# Patient Record
Sex: MALE | Race: WHITE | NOT HISPANIC OR LATINO | Employment: OTHER | URBAN - METROPOLITAN AREA
[De-identification: names, ages, dates, MRNs, and addresses within clinical notes are randomized per-mention and may not be internally consistent; named-entity substitution may affect disease eponyms.]

---

## 2017-01-24 ENCOUNTER — LAB CONVERSION - ENCOUNTER (OUTPATIENT)
Dept: OTHER | Facility: OTHER | Age: 77
End: 2017-01-24

## 2017-01-24 LAB
HBA1C MFR BLD HPLC: 6.9 % OF TOTAL HGB
TSH SERPL DL<=0.05 MIU/L-ACNC: 3.67 MIU/L (ref 0.4–4.5)

## 2017-01-27 ENCOUNTER — GENERIC CONVERSION - ENCOUNTER (OUTPATIENT)
Dept: OTHER | Facility: OTHER | Age: 77
End: 2017-01-27

## 2017-02-01 ENCOUNTER — ALLSCRIPTS OFFICE VISIT (OUTPATIENT)
Dept: OTHER | Facility: OTHER | Age: 77
End: 2017-02-01

## 2017-04-20 DIAGNOSIS — E11.65 TYPE 2 DIABETES MELLITUS WITH HYPERGLYCEMIA (HCC): ICD-10-CM

## 2017-04-20 DIAGNOSIS — E03.9 HYPOTHYROIDISM: ICD-10-CM

## 2017-05-11 ENCOUNTER — GENERIC CONVERSION - ENCOUNTER (OUTPATIENT)
Dept: OTHER | Facility: OTHER | Age: 77
End: 2017-05-11

## 2017-05-11 ENCOUNTER — LAB CONVERSION - ENCOUNTER (OUTPATIENT)
Dept: OTHER | Facility: OTHER | Age: 77
End: 2017-05-11

## 2017-05-11 LAB
CHOLEST SERPL-MCNC: 104 MG/DL (ref 125–200)
CHOLEST/HDLC SERPL: 3.1 (CALC)
HBA1C MFR BLD HPLC: 6 % OF TOTAL HGB
HDLC SERPL-MCNC: 34 MG/DL
LDL CHOLESTEROL (HISTORICAL): 57 MG/DL (CALC)
NON-HDL-CHOL (CHOL-HDL) (HISTORICAL): 70 MG/DL (CALC)
T4 FREE SERPL-MCNC: 1.1 NG/DL (ref 0.8–1.8)
TRIGL SERPL-MCNC: 63 MG/DL
TSH SERPL DL<=0.05 MIU/L-ACNC: 1.75 MIU/L (ref 0.4–4.5)

## 2017-05-23 ENCOUNTER — GENERIC CONVERSION - ENCOUNTER (OUTPATIENT)
Dept: OTHER | Facility: OTHER | Age: 77
End: 2017-05-23

## 2017-08-14 DIAGNOSIS — E11.65 TYPE 2 DIABETES MELLITUS WITH HYPERGLYCEMIA (HCC): ICD-10-CM

## 2017-09-20 ENCOUNTER — LAB CONVERSION - ENCOUNTER (OUTPATIENT)
Dept: OTHER | Facility: OTHER | Age: 77
End: 2017-09-20

## 2017-09-20 ENCOUNTER — GENERIC CONVERSION - ENCOUNTER (OUTPATIENT)
Dept: OTHER | Facility: OTHER | Age: 77
End: 2017-09-20

## 2017-09-20 LAB
CREATININE, RANDOM URINE (HISTORICAL): 165 MG/DL (ref 20–370)
HBA1C MFR BLD HPLC: 6.7 % OF TOTAL HGB
MAGNESIUM, UR (HISTORICAL): 1.4 MG/DL
MICROALBUMIN/CREATININE RATIO (HISTORICAL): 8 MCG/MG CREAT

## 2017-09-26 ENCOUNTER — ALLSCRIPTS OFFICE VISIT (OUTPATIENT)
Dept: OTHER | Facility: OTHER | Age: 77
End: 2017-09-26

## 2017-10-27 NOTE — PROGRESS NOTES
Assessment  1  Type 2 diabetes mellitus with ophthalmic complication, with long-term current use of   insulin (250 50,V58 67) (E11 39,Z79 4)   2  Hyperlipidemia (272 4) (E78 5)   3  Hypertension (401 9) (I10)   4  Hypothyroidism (244 9) (E03 9)    Plan  DM (diabetes mellitus), type 2, uncontrolled w/ophthalmic complication    · (1) HEMOGLOBIN A1C; Status:Canceled;    Perform:[facility]; DSZ:[id number]; Last Updated Marielle Frias; 9/25/2017 7:36:37 AM;Ordered; For:DM (diabetes mellitus), type 2, uncontrolled w/ophthalmic complication; Ordered By:Pura Jenkins; Hyperlipidemia, Hypertension, Type 2 diabetes mellitus with ophthalmic complication,  with long-term current use of insulin    · (1) COMPREHENSIVE METABOLIC PANEL; Status:Active; Requested for:44Nug4453;    Perform:[facility]; Due:10Fxb2078; Ordered;For:Hyperlipidemia, Hypertension, Type 2 diabetes mellitus with ophthalmic complication, with long-term current use of insulin; Ordered By:Pura Jenkins;  Hypothyroidism    · (1) T4, FREE; Status:Canceled;    Perform:[facility];Ordered;For:Hypothyroidism; Ordered By:Siddharth Sánchez;   · (1) TSH; Status:Canceled;    Perform:[facility]; DNP:[id number]; Last Updated Marielle Frias; 9/25/2017 7:36:37 AM;Ordered;For:Hypothyroidism; Ordered By:Pura Jenkins;   · (1) TSH; Status:Canceled;    Perform:[facility];Ordered;For:Hypothyroidism; Ordered By:Siddharth Sánchez;  Hypothyroidism, Type 2 diabetes mellitus with ophthalmic complication, with long-term  current use of insulin    · (1) T4, FREE; Status:Active; Requested for:05Ikt1653;    Perform:[facility]; Due:08Axp2393; Ordered;For:Hypothyroidism, Type 2 diabetes mellitus with ophthalmic complication, with long-term current use of insulin; Ordered By:Pura Jenkins;   · (1) TSH; Status:Active; Requested for:21Sdm9492;    Perform:[facility]; Due:16Fda3440; Ordered;For:Hypothyroidism, Type 2 diabetes mellitus with ophthalmic complication, with long-term current use of insulin; Ordered By:Mu Jenkins;  Type 2 diabetes mellitus with hyperglycemia    · (1) COMPREHENSIVE METABOLIC PANEL; Status:Canceled;    Perform:[facility];Ordered; For:Type 2 diabetes mellitus with hyperglycemia; Ordered By:Génesis Sánchez;   · (1) HEMOGLOBIN A1C; Status:Canceled;    Perform:[facility];Ordered; For:Type 2 diabetes mellitus with hyperglycemia; Ordered By:Génesis Sánchez;   · (1) LIPID PANEL, FASTING; Status:Canceled;    Perform:[facility];Ordered; For:Type 2 diabetes mellitus with hyperglycemia; Ordered By:Génesis Sánchez;  Type 2 diabetes mellitus with ophthalmic complication, with long-term current use of  insulin    · (1) HEMOGLOBIN A1C; Status:Active; Requested for:00Pge2252;    Perform:[facility]; Due:87Bgz2696; Ordered; For:Type 2 diabetes mellitus with ophthalmic complication, with long-term current use of insulin; Ordered By:Mu Jenkins; Uncontrolled type 2 diabetes mellitus, with long-term current use of insulin    · From  HumaLOG KwikPen 100 UNIT/ML Subcutaneous Solution Pen-injector  16 before breakfast, 20 before lunch, 30 before supper, 20 before  bedtime To HumaLOG KwikPen 100 UNIT/ML Subcutaneous Solution Pen-injector 16  before breakfast, 20 before lunch, 34 before supper, 10 before bedtime   Rx By: Fide Malone; Dispense: 90 Days ; #:7 X 3 ML Pen (5 Pens); Refill: 3;For: Uncontrolled type 2 diabetes mellitus, with long-term current use of insulin; ANY = N; Record   · Follow-up visit in 3 months Evaluation and Treatment  Follow-up  Status: Hold For -  Scheduling  Requested for: 81SZN6261   Ordered; For: Uncontrolled type 2 diabetes mellitus, with long-term current use of insulin; Ordered By: Fide Malone Performed:  Due: 97MBK8866    Discussion/Summary  1   Type 2 diabetes with opthalmic  complications on long-term insulin therapy -sugars fairly stable except hyperglycemia at bedtime  Increase Humalog to 34 units before dinner, continue to monitor blood sugars 4 times a day and send log in 2 weeks  being on intensive insulin therapy he is at risk for severe hypoglycemia and potential death  2  Hyperlipidemia - LDL at goal, continue statins  3  Hypertension-continue current regimen    4  Hypothyroidism-continue levothyroxine at current dose will check thyroid function test    Counseling Documentation With Imm: The patient was counseled regarding diagnostic results,-- instructions for management,-- risk factor reductions,-- impressions,-- risks and benefits of treatment options,-- importance of compliance with treatment  Patient's Capacity to Self-Care: Patient is able to Self-Care  Medication SE Review and Pt Understands Tx: Possible side effects of new medications were reviewed with the patient/guardian today  The treatment plan was reviewed with the patient/guardian  The patient/guardian understands and agrees with the treatment plan      Chief Complaint  Chief Complaint Free Text Note Form: Follow up  History of Present Illness  Diabetes: The patient is being seen for routine follow-up of Diabetes Mellitus 2  Current treatment includes Basal Insulin-- and-- Humalog  See Medication List for current medication(s)  See Medication List for dosage(s)  Source of information reported by review of the patient's logbook and indicates that the patient checks his blood sugar four times per day  Fasting blood sugars: generally 120-150  Pre-prandial blood sugars: generally 120-150  Bedtime blood sugars: generally 150-200  Symptoms reported by the patient include fatigue,-- recent weight gain-- and-- edema, but-- no polydipsia,-- no extremity numbness,-- no polyuria,-- no extremity paresthesias,-- no blurred vision,-- no diarrhea,-- no lower extremity ulcers,-- no nausea-- and-- no recent weight loss  Disease Course and Complications:  The last dilated fundus exam showed retinopathy  Family History: diabetes mellitus type 2  Hypothyroidism: The patient is being seen for follow-up of hypothyroidism  The patient has hypothyroidism of undetermined etiology  Current treatment includes levothyroxine  Symptoms:  fatigue,-- weight gain,-- myalgias,-- arthralgias-- and-- peripheral edema, but-- no weight loss,-- no palpitations,-- no constipation-- and-- no diarrhea  Hyperlipidemia (Follow-Up): The patient states his hyperlipidemia has been under good control since the last visit  Comorbid Illnesses: diabetes mellitus-- and-- hypertension  Symptoms: denies chest pain,-- denies intermittent leg claudication,-- denies muscle pain-- and-- denies muscle weakness  Associated symptoms include no focal neurologic deficits-- and-- no memory loss  Medications: the patient is adherent with his medication regimen  -- He denies medication side effects  Medication(s): a statin  Hypertension (Follow-Up): The patient presents for follow-up of essential hypertension  Comorbid Illnesses: retinopathy  Symptoms: denies impaired vision,-- stable dyspnea,-- denies chest pain,-- denies intermittent leg claudication-- and-- worsened lower extremity edema  Associated symptoms include no headache,-- no focal neurologic deficits-- and-- no memory loss  Medications: the patient is adherent with his medication regimen  -- He denies medication side effects  Medication(s): a diuretic-- and-- an ACE inhibitor  Review of Systems  Endo Adult ROS Male Established v2 - [facility]:   Constitutional/General: no recent weight gain,-- no recent weight loss,-- no poor energy/fatigue,-- no increased energy level,-- no insomnia/sleep problems,-- no fever-- and-- no feeling weak  Heart: high blood pressure, but-- no chest pain/tightness,-- no rapid/racing heart rate-- and-- no palpitations  Genitourinary - Urinary frequent urination, but-- no excess urination-- and-- no urinating during the night  Eyes: no blurred vision,-- no double vision,-- no bulging eyes,-- no gritty/scratchy eyes-- and-- no excessive tearing  Mouth / Throat: no hoarseness-- and-- no difficulty swallowing  Neck: no lumps,-- no swollen glands,-- no neck pain,-- no neck stiffness-- and-- no enlarged thyroid  Respiratory: no wheezing,-- no asthma-- and-- no persistent cough  Musculoskeletal: no muscle aches/pain,-- no joint aches/pain-- and-- no muscle weakness  Skin & Hair: no dry skin,-- no acne,-- the hair texture was not oily,-- no hair loss-- and-- no excessive hair growth  Gastrointestinal: no constipation,-- no diarrhea,-- no waking at night to drink-- and-- no stomach ache  Neurological: no blackouts,-- no weakness-- and-- no tremors  Genital: no testicular pain,-- no testicular lumps/bumps/mass-- and-- performs monthly testicular exam    Endocrine: no feeling hot frequently,-- no feeling cold frequently,-- no shifts between feeling hot and cold,-- no cold hands or feet,-- no excessive sweating,-- no thyroid problems,-- blood sugar problems,-- no excessive thirst,-- no excessive hunger,-- no change in shoe size,-- no nausea or vomiting-- and-- no shaky hands  ROS Reviewed:   ROS reviewed  Active Problems  1  DM (diabetes mellitus), type 2, uncontrolled w/ophthalmic complication (567 24)   (E11 39,E11 65)   2  Hyperlipidemia (272 4) (E78 5)   3  Hypertension (401 9) (I10)   4  Hypothyroidism (244 9) (E03 9)   5  Long-term insulin use (V58 67) (Z79 4)   6  Obesity (278 00) (E66 9)   7  Type 2 Diabetes Mellitus - Uncomplicated, Controlled (250 00)   8  Type 2 diabetes mellitus with hyperglycemia (250 00) (E11 65)    Past Medical History  1  History of Congestive heart failure (428 0) (I50 9)   2  History of Stroke Syndrome (436)  Active Problems And Past Medical History Reviewed: The active problems and past medical history were reviewed and updated today        Surgical History  Surgical History Reviewed: The surgical history was reviewed and updated today  Family History  Mother    1  Family history of Heart disease  Father    2  Family history of Heart disease  Son    3  Family history of Diabetes Mellitus (V18 0)  Family History Reviewed: The family history was reviewed and updated today  Social History   · Denied: History of Alcohol Use (History)   · Caffeine Use   · Denied: History of Drug Use   · Former smoker (W55 87) (S58 002)  Social History Reviewed: The social history was reviewed and updated today  Current Meds   1  BD Pen Needle Mini U/F 31G X 5 MM Miscellaneous; Use 6 times per day; Therapy: 21Dec2012 to (Kalpana Joya)  Requested for: 82FZP9231; Last   Rx:10Mar2017 Ordered   2  Calcium 600 MG Oral Tablet; Therapy: (Recorded:08Oct2013) to Recorded   3  Eye Vitamins Oral Capsule; Take as directed Recorded   4  FreeStyle Lite Test In Vitro Strip; TEST FOUR TIMES DAILY; Therapy: 78ESH1158 to (34 [phone])  Requested for: 06BXC9129; Last   Rx:54Vtp8071 Ordered   5  Furosemide 40 MG Oral Tablet; Therapy: [phone]) to Recorded   6  Glucosamine Chondroitin Complx CAPS; Therapy: [phone]) to Recorded   7  HumaLOG KwikPen 100 UNIT/ML Subcutaneous Solution Pen-injector; 16 before   breakfast, 20 before lunch, 30 before supper, 20 before bedtime; Therapy: 96SAE1066 to (Evaluate:20Duk4117)  Requested for: 53QOQ4588 Recorded   8  Lipitor 10 MG Oral Tablet; Therapy: [phone]) to Recorded   9  Lisinopril 20 MG Oral Tablet; Therapy: 67Fun8965 to (Evaluate:11Jan2015) Recorded   10  Multivitamins CAPS; Therapy: [phone]) to Recorded   11  Pantoprazole Sodium 40 MG Oral Tablet Delayed Release; Therapy: [phone]) to Recorded   12  Synthroid 88 MCG Oral Tablet; TAKE 1 TABLET DAILY; Therapy: 01YNH6795 to (Last Rx:03Orq3791)  Requested for: 74Xdq1290 Ordered   13  Tamsulosin HCl - 0 4 MG Oral Capsule;     Therapy: ([phone]) to Recorded   14  Dwayne SoloStar 300 UNIT/ML Subcutaneous Solution Pen-injector; 90 units qhs;    Therapy: 10IWM4436 to (Evaluate:25Mar2018)  Requested for: 93LZA7660 Recorded  Medication List Reviewed: The medication list was reviewed and updated today  Allergies  1  No Known Drug Allergies    Vitals  Vital Signs    Recorded: 61DLT5559 10:58AM   Heart Rate 72   Systolic 943   Diastolic 50   Height 5 ft 10 in   Weight 267 lb 4 96 oz   BMI Calculated 38 35   BSA Calculated 2 36     Physical Exam    Constitutional   General appearance: No acute distress, well appearing and well nourished  Eyes   Conjunctiva and lids: No swelling, erythema, or discharge  Pupils: Equal, round and reactive to light  The sclera are anicteric  Extraocular movements are intact  Ears, Nose, Mouth, and Throat   External inspection of ears, nose and lips: Normal     Exam of Head: The head is atraumatic and normocephalic  Neck: The neck is supple  The thyroid is normal in size with no palpable nodules  Pulmonary   Auscultation of lungs: Clear to auscultation bilaterally with normal chest expansion  Cardiovascular   Auscultation of heart: Normal rate and rhythm with no murmurs, gallops or rubs  Examination of extremities for edema and/or varicosities: Abnormal  -- + edema b/l LE , + chronic vascular changes  Examination of pulses: Dorsalis pedal pulses are +2 and equal bilaterally  Examination of Carotids: No bruits  Abdomen   Abdomen: Abdomen is soft, non-tender with normal bowel sounds  Lymphatic   Palpation of lymph nodes: No supraclavicular or suboccipital lymphadenopathy  Musculoskeletal   Gait and station: Normal     Inspection/palpation of joints, bones, and muscles: Muscle bulk and tone is normal     Skin   Skin and subcutaneous tissue: Normal skin temperature and color  Neurologic   Motor Strength: Strength is 5/5 bilaterally      Psychiatric   Orientation to person, place and time: Normal     Mood and affect: Affect and attention span are normal        Results/Data  (Q) MICROALBUMIN, RANDOM URINE (W/CREATININE) 44Axu0762 08:59AM Mariajose Kenyon     Test Name Result Flag Reference   CREATININE, RANDOM URINE 165 mg/dL     MICROALBUMIN 1 4 mg/dL     Reference Range  Not established   MICROALBUMIN/CREATININE$RATIO, RANDOM URINE 8 mcg/mg creat  <30   The ADA defines abnormalities in albumin  excretion as follows:     Category         Result (mcg/mg creatinine)     Normal                    <30  Microalbuminuria            Clinical albuminuria   > OR = 300     The ADA recommends that at least two of three  specimens collected within a 3-6 month period be  abnormal before considering a patient to be  within a diagnostic category  (Q) HEMOGLOBIN A1c 82Apv5886 08:59AM Primo Mariano   REPORT COMMENT:  FASTING:YES     Test Name Result Flag Reference   HEMOGLOBIN A1c 6 7 % of total Hgb H <5 7   For someone without known diabetes, a hemoglobin A1c  value of 6 5% or greater indicates that they may have   diabetes and this should be confirmed with a follow-up   test      For someone with known diabetes, a value <7% indicates   that their diabetes is well controlled and a value   greater than or equal to 7% indicates suboptimal   control  A1c targets should be individualized based on   duration of diabetes, age, comorbid conditions, and   other considerations  Currently, no consensus exists regarding use of  hemoglobin A1c for diagnosis of diabetes for children  (1) LIPID PANEL, FASTING 68HIC1923 08:10AM Ino Sánchez     Test Name Result Flag Reference   CHOLESTEROL, TOTAL 104 mg/dL L 125-200   HDL CHOLESTEROL 34 mg/dL L > OR = 40   TRIGLICERIDES 63 mg/dL  <472   LDL-CHOLESTEROL 57 mg/dL (calc)  <130   Desirable range <100 mg/dL for patients with CHD or  diabetes and <70 mg/dL for diabetic patients with  known heart disease     CHOL/HDLC RATIO 3 1 (calc)  < OR = 5 0   NON HDL CHOLESTEROL 70 mg/dL (calc)     Target for non-HDL cholesterol is 30 mg/dL higher than   LDL cholesterol target       Signatures   Electronically signed by : JOSÉ LUIS Larsen ; Sep 26 2017 11:37AM EST                       (Author)

## 2017-12-26 DIAGNOSIS — I10 ESSENTIAL (PRIMARY) HYPERTENSION: ICD-10-CM

## 2017-12-26 DIAGNOSIS — E03.9 HYPOTHYROIDISM: ICD-10-CM

## 2017-12-26 DIAGNOSIS — E78.5 HYPERLIPIDEMIA: ICD-10-CM

## 2017-12-26 DIAGNOSIS — E11.39 TYPE 2 DIABETES MELLITUS WITH OTHER DIABETIC OPHTHALMIC COMPLICATION (HCC): ICD-10-CM

## 2018-01-10 NOTE — MISCELLANEOUS
Provider Comments  Provider Comments:   PATIENT NO SHOWED FOR 5- APPT  Signatures   Electronically signed by :  Yrn Jordan; May 23 2017 11:43AM EST                       (Author)

## 2018-01-11 NOTE — PROGRESS NOTES
Plan    1  DSMT/MNT Time Record; Status:Complete;   Done: 91TTS9210 01:00PM    Discussion/Summary    PATIENT EDUCATION RECORD   Indication for Services: type 2 Diabetes Mellitus and obesity  He is ready to learn  He has no barriers to learning  Healthy Eating:   Discussed importance of meal timing/consistency: Method: Instruction and Handout  Response: Verbalizes Understanding and Needs Review   Discussed portion sizes: Method: Instruction and Handout  Response: Verbalizes Understanding and Needs Review   Discussed food label reading: Method: Instruction  Response: Verbalizes Understanding  Provided food diary and instructions on use: Method: Instruction and HandoutResponse: Verbalizes Understanding and Needs Review   Provided meal planning: Method: Instruction and Handout  Response: Verbalizes Understanding and Needs Review His current weight is 269  His keal needs are 1800/day  His CHO's per meal are 45-60  His protein needs are 85 g/day  He/She was provided a meal plan for: fixed carbohydrates and weight loss  Discussed basic carbohydrate counting: Method: Instruction and Handout  Response: Verbalizes Understanding and Needs Review   Being Active:   Stated the benefits of exercise: Method: Instruction  Response: Verbalizes Understanding and Needs Review He was given the following educational materials: portion book, Personal Meal Plan 1800 calories and CCS 2 week 1800 heather menu   Chief Complaint  Gabino's diet history reveals moderate carbohydrate intake at breakfast and increasing amounts as the day proceeds  Inadequate non-starchy vegetables, fruit, fiber, and low-fat dairy intake is evident  Discussed importance of consistent intake to ensure improved glucose excursions and BG readings at meals  Recommended additional vegetable intake to replace large carbohydrate portions  Explained benefit of exercise to BG as well as BP and cholesterol levels   He seemed willing to start walking in the mornings before his wife gets going since she needs his assistance with ADL's  Recommended he complete 3 day food record and return for f/u for further education  History of Present Illness  Gabino was seen for MNT for uncontrolled type 2 diabetes  This is his initial assessment    Present at session: patient  and spouse    He has no special learning needs  His caloric needs are 1800  He has had no recent weight change  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  Exercise routine:  None formal  Active daily with household duties and caregiving as wife is s/p CVA and is wheelchair bound  He eats breakfast at  AM ~2 c  Cheerios w/ splenda and 2% milk OR toasted bagel thins w/ lite cream cheese, hot tea    He eats lunch at  12:30-1 PM None yet today  Yesterday had sliced hard boiled egg sandwich w/ alanis on 2 slices white bread, 1-2 store bakery cookies, iced tea or diet soda   He snacks at mid PM 5-6 unsalted saltine crackers or sugar free chocolate coated vanilla ice cream pop, diet soda or tea   He eats dinner at  6:30 PM 1 cup or more ziti noodles w/ Ragu pasta sauce and meatball, 2 slices [place] bread OR 2c  pasta casserole   He snacks at 11:30 at bedtime 5-6 crackers but usually nothing     NUTRITION DIAGNOSES   Inconsistent Intake Carbs   Inconsistent carbohydrate intake related to: Physiological causes requiring careful timing and consistency in the amount of carbohydrate (i e  diabetes mellitus, hypoglycemia)  As evidenced by: Estimated carbohydrate intake that is different from recommended types or ingested on an irregular basis and Use of insulin or insulin secretagogues   Food And Nutrition Related Knowledge Defici   Food and nutrition related knowledge deficit related to  unwilling or disinterested in learning/applying information    As evidenced by  verbalizes inaccurate or incomplete information and demonstrates inability to apply food and nutrition related information (i e  select foods based on nutrition therapy)  He received extensive carbohydrate counting education in January 2015 but recall reveals little application of same  Medical Nutrition Therapy Intervention: Carbohydrate counting, Meal planning, Individualized meal plan, Strategies to increase the intake of plant based foods, Strategies to monitor portion control, Label reading, Meal timing and Exercise Guidelines  His comprehension was good   His motivation was fair   His compliance was fair   Goals:  1  Cut supper starch portions in half  2  Add morning walk before your wife arises  3   Eat more non-starchy vegetables      Vitals  Signs [Data Includes: Current Encounter]   Recorded: 72ZWQ8425 10:24AM   Height: 5 ft 10 in  Weight: 269 lb   BMI Calculated: 38 6  BSA Calculated: 2 37    Future Appointments    Date/Time Provider Specialty Site   06/14/2016 11:00 AM [place] [facility]     Signatures   Electronically signed by : Justin Soria RD; Mar 23 2016 10:52AM EST                       (Author)    Electronically signed by : JOSÉ LUIS Hays ; Mar 23 2016  2:29PM EST

## 2018-01-12 VITALS
DIASTOLIC BLOOD PRESSURE: 50 MMHG | SYSTOLIC BLOOD PRESSURE: 130 MMHG | BODY MASS INDEX: 38.27 KG/M2 | HEART RATE: 72 BPM | WEIGHT: 267.31 LBS | HEIGHT: 70 IN

## 2018-01-13 NOTE — RESULT NOTES
Verified Results  (1) LIPID PANEL, FASTING [id number] 08:10AM Nicole Sánchez Melony     Test Name Result Flag Reference   CHOLESTEROL, TOTAL 104 mg/dL L 125-200   HDL CHOLESTEROL 34 mg/dL L > OR = 40   TRIGLICERIDES 63 mg/dL  <449   LDL-CHOLESTEROL 57 mg/dL (calc)  <130   Desirable range <100 mg/dL for patients with CHD or  diabetes and <70 mg/dL for diabetic patients with  known heart disease  CHOL/HDLC RATIO 3 1 (calc)  < OR = 5 0   NON HDL CHOLESTEROL 70 mg/dL (calc)     Target for non-HDL cholesterol is 30 mg/dL higher than   LDL cholesterol target  (1) T4, FREE [id number] 08:10AM [facility] Melony     Test Name Result Flag Reference   T4, FREE 1 1 ng/dL  0 8-1 8     (Q) TSH, 3RD GENERATION 33KES8430 08:10AM Nicole Sánchez Melony     Test Name Result Flag Reference   TSH 1 75 mIU/L  0 40-4 50     (Q) HEMOGLOBIN A1c [id number] 08:10AM Isai Sánchez   REPORT COMMENT:  FASTING:YES     Test Name Result Flag Reference   HEMOGLOBIN A1c 6 0 % of total Hgb H <5 7   For someone without known diabetes, a hemoglobin   A1c value between 5 7% and 6 4% is consistent with  prediabetes and should be confirmed with a   follow-up test      For someone with known diabetes, a value <7%  indicates that their diabetes is well controlled  A1c  targets should be individualized based on duration of  diabetes, age, comorbid conditions, and other  considerations  This assay result is consistent with an increased risk  of diabetes  Currently, no consensus exists regarding use of  hemoglobin A1c for diagnosis of diabetes for children

## 2018-01-14 VITALS
BODY MASS INDEX: 37.26 KG/M2 | SYSTOLIC BLOOD PRESSURE: 112 MMHG | HEIGHT: 70 IN | WEIGHT: 260.31 LBS | HEART RATE: 72 BPM | DIASTOLIC BLOOD PRESSURE: 62 MMHG

## 2018-01-15 NOTE — RESULT NOTES
Message   a1c 6 9 at goal, kidney function tests high - could be due to dehydration - keep well hydrated and repeat BMP in 1 week     Verified Results  (1) LIPID PANEL, FASTING 23Jan2017 07:29AM Ajmes Acre     Test Name Result Flag Reference   CHOLESTEROL, TOTAL 92 mg/dL L 125-200   HDL CHOLESTEROL 31 mg/dL L > OR = 40   TRIGLICERIDES 82 mg/dL  <503   LDL-CHOLESTEROL 45 mg/dL (calc)  <130   Desirable range <100 mg/dL for patients with CHD or  diabetes and <70 mg/dL for diabetic patients with  known heart disease  CHOL/HDLC RATIO 3 0 (calc)  < OR = 5 0   NON HDL CHOLESTEROL 61 mg/dL (calc)     Target for non-HDL cholesterol is 30 mg/dL higher than   LDL cholesterol target  (1) COMPREHENSIVE METABOLIC PANEL [id number] 77:98VV James Acre     Test Name Result Flag Reference   GLUCOSE 100 mg/dL H 65-99   Fasting reference interval   UREA NITROGEN (BUN) 33 mg/dL H 7-25   CREATININE 1 43 mg/dL H 0 70-1 18   For patients >52years of age, the reference limit  for Creatinine is approximately 13% higher for people  identified as -American  eGFR NON-AFR   AMERICAN 48 mL/min/1 73m2 L > OR = 60   eGFR AFRICAN AMERICAN 55 mL/min/1 73m2 L > OR = 60   BUN/CREATININE RATIO 23 (calc) H 6-22   SODIUM 143 mmol/L  135-146   POTASSIUM 5 0 mmol/L  3 5-5 3   CHLORIDE 99 mmol/L     CARBON DIOXIDE 33 mmol/L H 20-31   CALCIUM 9 5 mg/dL  8 6-10 3   PROTEIN, TOTAL 6 6 g/dL  6 1-8 1   ALBUMIN 3 9 g/dL  3 6-5 1   GLOBULIN 2 7 g/dL (calc)  1 9-3 7   ALBUMIN/GLOBULIN RATIO 1 4 (calc)  1 0-2 5   BILIRUBIN, TOTAL 0 8 mg/dL  0 2-1 2   ALKALINE PHOSPHATASE 75 U/L     AST 26 U/L  10-35   ALT 22 U/L  9-46     (1) T4, FREE 23Jan2017 07:29AM James Acre     Test Name Result Flag Reference   T4, FREE 1 1 ng/dL  0 8-1 8     (Q) TSH, 3RD GENERATION [id number] 07:29AM James Acre     Test Name Result Flag Reference   TSH 3 67 mIU/L  0 40-4 50     (Q) HEMOGLOBIN A1c [id number] 07:29AM James Patel   REPORT COMMENT:  [id number]  FASTING:YES     Test Name Result Flag Reference   HEMOGLOBIN A1c 6 9 % of total Hgb H <5 7   According to ADA guidelines, hemoglobin A1c <7 0%  represents optimal control in non-pregnant diabetic  patients  Different metrics may apply to specific  patient populations  Standards of Medical Care in    Diabetes Care  2013;36:s11-s66     For the purpose of screening for the presence of  diabetes  <5 7%       Consistent with the absence of diabetes  5 7-6 4%    Consistent with increased risk for diabetes              (prediabetes)  >or=6 5%    Consistent with diabetes     This assay result is consistent with diabetes  mellitus  Currently, no consensus exists for use of hemoglobin  A1c for diagnosis of diabetes for children

## 2018-02-23 ENCOUNTER — TELEPHONE (OUTPATIENT)
Dept: ENDOCRINOLOGY | Facility: CLINIC | Age: 78
End: 2018-02-23

## 2018-02-24 LAB
T4 FREE SERPL-MCNC: 1.1 NG/DL (ref 0.8–1.8)
TSH SERPL-ACNC: 1.21 MIU/L (ref 0.4–4.5)

## 2018-02-26 LAB
ALBUMIN SERPL-MCNC: 4 G/DL (ref 3.6–5.1)
ALBUMIN/GLOB SERPL: 1.5 (CALC) (ref 1–2.5)
ALP SERPL-CCNC: 78 U/L (ref 40–115)
ALT SERPL-CCNC: 21 U/L (ref 9–46)
AST SERPL-CCNC: 21 U/L (ref 10–35)
BASOPHILS # BLD AUTO: 39 CELLS/UL (ref 0–200)
BASOPHILS NFR BLD AUTO: 0.6 %
BILIRUB SERPL-MCNC: 0.6 MG/DL (ref 0.2–1.2)
BUN SERPL-MCNC: 75 MG/DL (ref 7–25)
BUN/CREAT SERPL: 48 (CALC) (ref 6–22)
CALCIUM SERPL-MCNC: 10 MG/DL (ref 8.6–10.3)
CHLORIDE SERPL-SCNC: 105 MMOL/L (ref 98–110)
CO2 SERPL-SCNC: 35 MMOL/L (ref 20–31)
CREAT SERPL-MCNC: 1.57 MG/DL (ref 0.7–1.18)
EOSINOPHIL # BLD AUTO: 143 CELLS/UL (ref 15–500)
EOSINOPHIL NFR BLD AUTO: 2.2 %
ERYTHROCYTE [DISTWIDTH] IN BLOOD BY AUTOMATED COUNT: 13.9 % (ref 11–15)
EST. AVERAGE GLUCOSE BLD GHB EST-MCNC: 154 (CALC)
EST. AVERAGE GLUCOSE BLD GHB EST-SCNC: 8.5 (CALC)
FERRITIN SERPL-MCNC: 47 NG/ML (ref 20–380)
GLOBULIN SER CALC-MCNC: 2.6 G/DL (CALC) (ref 1.9–3.7)
GLUCOSE SERPL-MCNC: 130 MG/DL (ref 65–99)
HBA1C MFR BLD: 7 % OF TOTAL HGB
HCT VFR BLD AUTO: 40 % (ref 38.5–50)
HGB BLD-MCNC: 13.4 G/DL (ref 13.2–17.1)
IRON SATN MFR SERPL: 35 % (CALC) (ref 15–60)
IRON SERPL-MCNC: 112 MCG/DL (ref 50–180)
LYMPHOCYTES # BLD AUTO: 995 CELLS/UL (ref 850–3900)
LYMPHOCYTES NFR BLD AUTO: 15.3 %
MCH RBC QN AUTO: 30.1 PG (ref 27–33)
MCHC RBC AUTO-ENTMCNC: 33.5 G/DL (ref 32–36)
MCV RBC AUTO: 89.9 FL (ref 80–100)
MONOCYTES # BLD AUTO: 527 CELLS/UL (ref 200–950)
MONOCYTES NFR BLD AUTO: 8.1 %
NEUTROPHILS # BLD AUTO: 4797 CELLS/UL (ref 1500–7800)
NEUTROPHILS NFR BLD AUTO: 73.8 %
PLATELET # BLD AUTO: 164 THOUSAND/UL (ref 140–400)
PMV BLD REES-ECKER: 9.5 FL (ref 7.5–12.5)
POTASSIUM SERPL-SCNC: 5.1 MMOL/L (ref 3.5–5.3)
PROT SERPL-MCNC: 6.6 G/DL (ref 6.1–8.1)
RBC # BLD AUTO: 4.45 MILLION/UL (ref 4.2–5.8)
SL AMB EGFR AFRICAN AMERICAN: 49 ML/MIN/1.73M2
SL AMB EGFR NON AFRICAN AMERICAN: 42 ML/MIN/1.73M2
SODIUM SERPL-SCNC: 143 MMOL/L (ref 135–146)
TIBC SERPL-MCNC: 321 MCG/DL (CALC) (ref 250–425)
WBC # BLD AUTO: 6.5 THOUSAND/UL (ref 3.8–10.8)

## 2018-02-27 RX ORDER — GLUCOSAMINE SULF/CHONDROITIN A 500-250 MG
1 CAPSULE ORAL DAILY
COMMUNITY
End: 2018-08-16

## 2018-02-27 RX ORDER — TAMSULOSIN HYDROCHLORIDE 0.4 MG/1
1 CAPSULE ORAL DAILY
COMMUNITY
End: 2018-08-02 | Stop reason: SDUPTHER

## 2018-02-27 RX ORDER — ATORVASTATIN CALCIUM 10 MG/1
1 TABLET, FILM COATED ORAL DAILY
COMMUNITY
End: 2018-12-05 | Stop reason: DRUGHIGH

## 2018-02-27 RX ORDER — PHENOL 1.4 %
1 AEROSOL, SPRAY (ML) MUCOUS MEMBRANE DAILY
COMMUNITY
End: 2018-10-30 | Stop reason: ALTCHOICE

## 2018-02-27 RX ORDER — PANTOPRAZOLE SODIUM 40 MG/1
1 TABLET, DELAYED RELEASE ORAL DAILY
COMMUNITY
End: 2018-09-10 | Stop reason: SDUPTHER

## 2018-02-27 RX ORDER — FUROSEMIDE 40 MG/1
80 TABLET ORAL 2 TIMES DAILY
COMMUNITY
End: 2018-10-30 | Stop reason: SDUPTHER

## 2018-02-27 RX ORDER — LISINOPRIL 20 MG/1
1 TABLET ORAL DAILY
COMMUNITY
Start: 2014-02-21 | End: 2018-12-05 | Stop reason: SDUPTHER

## 2018-02-27 RX ORDER — LEVOTHYROXINE SODIUM 88 UG/1
1 TABLET ORAL DAILY
COMMUNITY
Start: 2011-12-13 | End: 2018-12-05 | Stop reason: SDUPTHER

## 2018-03-01 ENCOUNTER — OFFICE VISIT (OUTPATIENT)
Dept: ENDOCRINOLOGY | Facility: CLINIC | Age: 78
End: 2018-03-01
Payer: MEDICARE

## 2018-03-01 VITALS
WEIGHT: 238 LBS | BODY MASS INDEX: 35.25 KG/M2 | HEART RATE: 58 BPM | DIASTOLIC BLOOD PRESSURE: 60 MMHG | SYSTOLIC BLOOD PRESSURE: 106 MMHG | HEIGHT: 69 IN

## 2018-03-01 DIAGNOSIS — E78.5 HYPERLIPIDEMIA, UNSPECIFIED HYPERLIPIDEMIA TYPE: Primary | ICD-10-CM

## 2018-03-01 DIAGNOSIS — E03.9 HYPOTHYROIDISM, UNSPECIFIED TYPE: ICD-10-CM

## 2018-03-01 DIAGNOSIS — Z79.4 TYPE 2 DIABETES MELLITUS WITH OTHER OPHTHALMIC COMPLICATION, WITH LONG-TERM CURRENT USE OF INSULIN (HCC): ICD-10-CM

## 2018-03-01 DIAGNOSIS — E11.39 TYPE 2 DIABETES MELLITUS WITH OTHER OPHTHALMIC COMPLICATION, WITH LONG-TERM CURRENT USE OF INSULIN (HCC): ICD-10-CM

## 2018-03-01 DIAGNOSIS — I10 HYPERTENSION, UNSPECIFIED TYPE: ICD-10-CM

## 2018-03-01 PROBLEM — J96.10 CHRONIC RESPIRATORY FAILURE (HCC): Status: ACTIVE | Noted: 2017-10-24

## 2018-03-01 PROBLEM — K92.1 HEMATOCHEZIA: Status: ACTIVE | Noted: 2017-08-20

## 2018-03-01 PROBLEM — G47.33 OBSTRUCTIVE SLEEP APNEA SYNDROME: Status: ACTIVE | Noted: 2017-05-02

## 2018-03-01 PROBLEM — N17.9 AKI (ACUTE KIDNEY INJURY) (HCC): Status: ACTIVE | Noted: 2017-01-11

## 2018-03-01 PROBLEM — I35.0 AORTIC STENOSIS, MODERATE: Status: ACTIVE | Noted: 2017-05-03

## 2018-03-01 PROBLEM — K21.9 GERD (GASTROESOPHAGEAL REFLUX DISEASE): Status: ACTIVE | Noted: 2017-01-11

## 2018-03-01 PROBLEM — I87.2 VENOUS INSUFFICIENCY: Status: ACTIVE | Noted: 2017-11-20

## 2018-03-01 PROBLEM — I50.9 CHF (CONGESTIVE HEART FAILURE) (HCC): Status: ACTIVE | Noted: 2017-01-11

## 2018-03-01 PROBLEM — N40.0 BPH (BENIGN PROSTATIC HYPERPLASIA): Status: ACTIVE | Noted: 2017-01-11

## 2018-03-01 PROBLEM — D50.9 HYPOCHROMIC MICROCYTIC ANEMIA: Status: ACTIVE | Noted: 2017-08-21

## 2018-03-01 PROBLEM — D69.6 THROMBOCYTOPENIA (HCC): Status: ACTIVE | Noted: 2017-01-20

## 2018-03-01 PROCEDURE — 99215 OFFICE O/P EST HI 40 MIN: CPT | Performed by: PHYSICIAN ASSISTANT

## 2018-03-01 NOTE — ASSESSMENT & PLAN NOTE
At goal based on recent A1C of 7 0  He is having consistent hyperglycemia at bedtime  Increase Pre-Dinner Novolog from 34 to 38 units  Due to being on intensive insulin therapy, he at high risk of severe hypoglycemia  Severe hypoglycemia can result in falls, injury, coma, seizure, or death

## 2018-03-01 NOTE — PATIENT INSTRUCTIONS
Increase Humalog to 38 before dinner  Send log in two weeks or call ASAP if problems    Complete lab testing before next visit

## 2018-03-01 NOTE — LETTER
March 1, 2018     MD Gwen [address]  [address] [facility]    Patient: Travis Deshpande   YOB: 1940   Date of Visit: 3/1/2018       Dear Dr Christopher Hdz:    Thank you for referring Dee Sands to me for evaluation  Below are my notes for this consultation  If you have questions, please do not hesitate to call me  I look forward to following your patient along with you  Sincerely,        Nasreen Bhat PA-C        CC: No Recipients  Nasreen Bhat PA-C  3/1/2018  1:20 PM  Sign at close encounter      Established Patient Progress Note      Chief Complaint   Patient presents with    Diabetes Type 2    Hypertension    Hyperlipidemia    Hypothyroidism          History of Present Illness:   Travis Deshpande is a 69 y o  male with a history of type 2 diabetes with long term use of insulin   Michael Velazquez Reports complications of retinopathy and CKD  Since last visit he was in hospital with CHF and anemia  Did not need blood transfusion    Denies recent severe hypoglycemic or severe hyperglycemic episodes  Denies any issues with his current regimen  home glucose monitoring: are performed regularly 4x per day  Blood sugars have been better overall since taking Toujeo  Home blood glucose readings:   Before breakfast: low 100s  Before lunch: low/mid 100s  Before dinner: low/mid 100s   Bedtime: 180 to over 200  Current regimen: Toujeo 60 units daily (lowered in hospital) and Humalog 16-20-34 before meals plus 10 at bedtime if BG over 200  Last Eye Exam: UTD, had recent appt   Last Foot Exam: UTD with podiatry, had appt in January  Has hypertension: Taking lisinopril  Has hyperlipidemia: Taking atorvasatin    Thyroid disorders: hypothyroid- taking levothyroxine       Patient Active Problem List   Diagnosis    TIFFANY (acute kidney injury) ([facility] [address] )    Aortic stenosis, moderate    BPH (benign prostatic hyperplasia)    CH[facility]congestive heart failure) ([facility])    Chronic respiratory failure (HCC)    GERD (gastroesophageal reflux disease)    Hematochezia    Hyperlipidemia    Hypertension    Hypochromic microcytic anemia    Hypothyroidism    Obesity    Obstructive sleep apnea syndrome    Thrombocytopenia (HCC)    Type 2 diabetes mellitus with ophthalmic complication, with long-term current use of insulin (HCC)    Venous insufficiency      History reviewed  No pertinent past medical history  History reviewed  No pertinent surgical history     Family History   Problem Relation Age of Onset    Diabetes unspecified Maternal Grandfather     Diabetes unspecified Paternal Grandfather      Social History   Substance Use Topics    Smoking status: Never Smoker    Smokeless tobacco: Never Used    Alcohol use No     No Known Allergies      Current Outpatient Prescriptions:     atorvastatin (LIPITOR) 10 mg tablet, Take 1 tablet by mouth daily, Disp: , Rfl:     calcium carbonate (CALCIUM 600) 600 MG tablet, Take 1 tablet by mouth daily, Disp: , Rfl:     furosemide (LASIX) 40 mg tablet, Take 80 mg by mouth 2 (two) times a day  , Disp: , Rfl:     Glucosamine-Chondroitin 500-250 MG CAPS, Take 1 capsule by mouth daily, Disp: , Rfl:     glucose blood (FREESTYLE LITE) test strip, 1 application by In Vitro route 4 (four) times a day, Disp: , Rfl:     Insulin Glargine (TOUJEO SOLOSTAR) injection pen 300 units/mL, Inject 60 Units under the skin daily, Disp: 3 pen, Rfl: 0    insulin lispro (HUMALOG KWIKPEN) 100 Units/mL SOPN, Inject 60 Units under the skin see administration instructions 16 units with breakfast 20 with lunch 38 with dinner 10 at bedtime if needed if BG over 200, Disp: 5 pen, Rfl: 0    levothyroxine (SYNTHROID) 88 mcg tablet, Take 1 tablet by mouth daily, Disp: , Rfl:     lisinopril (ZESTRIL) 20 mg tablet, Take 1 tablet by mouth daily, Disp: , Rfl:     Multiple Vitamins-Minerals (EYE VITAMINS) CAPS, Take 1 capsule by mouth daily, Disp: , Rfl:     Multiple Vitamins-Minerals (MULTIVITAMIN ADULT PO), Take 1 tablet by mouth daily, Disp: , Rfl:     pantoprazole (PROTONIX) 40 mg tablet, Take 1 tablet by mouth daily, Disp: , Rfl:     tamsulosin (FLOMAX) 0 4 mg, Take 1 capsule by mouth daily, Disp: , Rfl:     Review of Systems   Constitutional: Negative for activity change, appetite change and fatigue  HENT: Negative for sore throat, trouble swallowing and voice change  Eyes: Negative for visual disturbance  Respiratory: Negative for choking, chest tightness and shortness of breath  Cardiovascular: Negative for chest pain, palpitations and leg swelling  Gastrointestinal: Negative for abdominal pain, constipation and diarrhea  Endocrine: Negative for cold intolerance, heat intolerance, polydipsia, polyphagia and polyuria  Genitourinary: Negative for frequency  Musculoskeletal: Negative for arthralgias and myalgias  Skin: Negative for rash  Neurological: Negative for dizziness and syncope  Hematological: Negative for adenopathy  Psychiatric/Behavioral: Negative for sleep disturbance  All other systems reviewed and are negative  Physical Exam:  Body mass index is 35 15 kg/m²  /60   Pulse 58   Ht 5' 9" (1 753 m)   Wt 108 kg (238 lb)   BMI 35 15 kg/m²     Wt Readings from Last 3 Encounters:   03/01/18 108 kg (238 lb)   09/26/17 121 kg (267 lb 4 9 oz)   02/01/17 118 kg (260 lb 4 9 oz)       Physical Exam   Constitutional: He is oriented to person, place, and time  He appears well-developed and well-nourished  No distress  HENT:   Head: Normocephalic and atraumatic  Mouth/Throat: Oropharynx is clear and moist    Eyes: Conjunctivae and EOM are normal  Pupils are equal, round, and reactive to light  Neck: Normal range of motion  Neck supple  No thyromegaly present  Cardiovascular: Normal rate, regular rhythm and normal heart sounds  No murmur heard    Pulmonary/Chest: Effort normal and breath sounds normal  No respiratory distress  He has no wheezes  He has no rales  Abdominal: Soft  Bowel sounds are normal  He exhibits no distension  There is no tenderness  Musculoskeletal: Normal range of motion  He exhibits no edema  Lymphadenopathy:     He has no cervical adenopathy  Neurological: He is alert and oriented to person, place, and time  Skin: Skin is warm and dry  Psychiatric: He has a normal mood and affect  Vitals reviewed      Diabetic Foot Exam    Labs:   Component      Latest Ref Rng & Units 5/10/2017 9/18/2017 2/23/2018   SL AMB LAB WHITE BLOOD CELL COUNT      3 8 - 10 8 Thousand/uL   6 5   SL AMB LAB RED BLOOD CELLS      4 20 - 5 80 Million/uL   4 45   Hemoglobin      13 2 - 17 1 g/dL   13 4   SL AMB HEMATOCRIT      38 5 - 50 0 %   40 0   SL AMB MCV      80 0 - 100 0 fL   89 9   SL AMB MCH      27 0 - 33 0 pg   30 1   SL AMB MCHC      32 0 - 36 0 g/dL   33 5   SL AMB RDW      11 0 - 15 0 %   13 9   SL AMB PLATELET COUNT      503 - 400 Thousand/uL   164   SL AMB MPV      7 5 - 12 5 fL   9 5   Neutrophils (Absolute)      1,500 - 7,800 cells/uL   4,797   Lymphocytes (Absolute)      850 - 3,900 cells/uL   995   Monocytes (Absolute)      200 - 950 cells/uL   527   Eosinophils (Absolute)      15 - 500 cells/uL   143   Basophils (Absolute)      0 - 200 cells/uL   39   SL AMB NEUTROPHILS      %   73 8   SL AMB LYMPHOCYTES      %   15 3   SL AMB MONOCYTES      %   8 1   SL AMB EOSINOPHILS      %   2 2   SL AMB BASOPHILS      %   0 6   SL AMB GLUCOSE      65 - 99 mg/dL   130 (H)   BUN      7 - 25 mg/dL   75 (H)   Creatinine      0 70 - 1 18 mg/dL   1 57 (H)   eGFR Non       > OR = 60 mL/min/1 73m2   42 (L)   SL AMB EGFR       > OR = 60 mL/min/1 73m2   49 (L)   SL AMB BUN/CREATININE RATIO      6 - 22 (calc)   48 (H)   SL AMB SODIUM      135 - 146 mmol/L   143   SL AMB POTASSIUM      3 5 - 5 3 mmol/L   5 1   SL AMB CHLORIDE      98 - 110 mmol/L   105   SL AMB CARBON DIOXIDE      20 - 31 mmol/L 35 (H)   SL AMB CALCIUM      8 6 - 10 3 mg/dL   10 0   SL AMB PROTEIN, TOTAL      6 1 - 8 1 g/dL   6 6   Serum Albumin      3 6 - 5 1 g/dL   4 0   SL AMB GLOBULIN      1 9 - 3 7 g/dL (calc)   2 6   SL AMB ALBUMIN/GLOBULIN RATIO      1 0 - 2 5 (calc)   1 5   SL AMB BILIRUBIN, TOTAL      0 2 - 1 2 mg/dL   0 6   SL AMB ALKALINE PHOSPHATASE      40 - 115 U/L   78   SL AMB AST      10 - 35 U/L   21   SL AMB ALT      9 - 46 U/L   21   Cholesterol      125 - 200 mg/dL 104 (L)     HDL      > OR = 40 mg/dL 34 (L)     Triglycerides      <150 mg/dL 63     LDL CHOLESTEROL      <130 mg/dL (calc) 57     Chol/HDL Ratio      < OR = 5 0 (calc) 3 1     NON-HDL-CHOL (CHOL-HDL)      mg/dL (calc) 70     CREATININE, RANDOM URINE      20 - 370 mg/dL  165    MAGNESIUM, UR      mg/dL  1 4    MICROALBUMIN/CREATININE RATIO      <30 mcg/mg creat  8    Iron      50 - 180 mcg/dL   112   TIBC      250 - 425 mcg/dL (calc)   321   Iron Saturation      15 - 60 % (calc)   35   Hemoglobin A1C      <5 7 % of total Hgb   7 0 (H)   Estimated Average Glucose      (calc)   154   Estimated Average Glucose (mmol/L)      (calc)   8 5   Free T4      0 8 - 1 8 ng/dL   1 1   TSH      0 40 - 4 50 mIU/L   1 21   Ferritin      20 - 380 ng/mL   47       Impression & Plan:    Problem List Items Addressed This Visit     Hyperlipidemia - Primary     Continue Statin  Relevant Medications    atorvastatin (LIPITOR) 10 mg tablet    Hypertension     BP under good control, continue regimen  Relevant Medications    furosemide (LASIX) 40 mg tablet    lisinopril (ZESTRIL) 20 mg tablet    Hypothyroidism     TSH at goal, continue levothyroxine  Relevant Medications    levothyroxine (SYNTHROID) 88 mcg tablet    Type 2 diabetes mellitus with ophthalmic complication, with long-term current use of insulin ([address] )     At goal based on recent A1C of 7 0  He is having consistent hyperglycemia at bedtime  Increase Pre-Dinner Novolog from 34 to 38 units  Relevant Medications    insulin lispro (HUMALOG KWIKPEN) 100 Units/mL SOPN    Insulin Glargine (TOUJEO SOLOSTAR) injection pen 300 units/mL    Other Relevant Orders    HEMOGLOBIN A1C W/ EAG ESTIMATION    Basic metabolic panel          Orders Placed This Encounter   Procedures    HEMOGLOBIN A1C W/ EAG ESTIMATION     Standing Status:   Future     Standing Expiration Date:   3/1/2019    Basic metabolic panel     This is a patient instruction: Patient fasting for 8 hours or longer recommended  Standing Status:   Future     Standing Expiration Date:   3/1/2019       Patient Instructions   Increase Humalog to 38 before dinner  Send log in two weeks or call ASAP if problems    Complete lab testing before next visit  Discussed with the patient and all questioned fully answered  He will call me if any problems arise  Follow-up appointment in 4 months       Counseled patient on diagnostic results, prognosis, risk and benefit of treatment options, instruction for management, importance of treatment compliance, Risk  factor reduction and impressions      Olivia Hansen PA-C

## 2018-04-04 ENCOUNTER — TELEPHONE (OUTPATIENT)
Dept: ENDOCRINOLOGY | Facility: CLINIC | Age: 78
End: 2018-04-04

## 2018-04-30 ENCOUNTER — TELEPHONE (OUTPATIENT)
Dept: ENDOCRINOLOGY | Facility: CLINIC | Age: 78
End: 2018-04-30

## 2018-06-07 LAB
EST. AVERAGE GLUCOSE BLD GHB EST-MCNC: 146 (CALC)
EST. AVERAGE GLUCOSE BLD GHB EST-SCNC: 8.1 (CALC)
HBA1C MFR BLD: 6.7 % OF TOTAL HGB

## 2018-06-08 ENCOUNTER — TELEPHONE (OUTPATIENT)
Dept: ENDOCRINOLOGY | Facility: CLINIC | Age: 78
End: 2018-06-08

## 2018-06-08 LAB
ALBUMIN SERPL-MCNC: 4 G/DL (ref 3.6–5.1)
ALBUMIN/GLOB SERPL: 1.5 (CALC) (ref 1–2.5)
ALP SERPL-CCNC: 78 U/L (ref 40–115)
ALT SERPL-CCNC: 15 U/L (ref 9–46)
AST SERPL-CCNC: 14 U/L (ref 10–35)
BASOPHILS # BLD AUTO: 60 CELLS/UL (ref 0–200)
BASOPHILS NFR BLD AUTO: 1 %
BILIRUB SERPL-MCNC: 0.5 MG/DL (ref 0.2–1.2)
BUN SERPL-MCNC: 81 MG/DL (ref 7–25)
BUN/CREAT SERPL: 50 (CALC) (ref 6–22)
CALCIUM SERPL-MCNC: 9.4 MG/DL (ref 8.6–10.3)
CHLORIDE SERPL-SCNC: 106 MMOL/L (ref 98–110)
CO2 SERPL-SCNC: 30 MMOL/L (ref 20–31)
CREAT SERPL-MCNC: 1.62 MG/DL (ref 0.7–1.18)
EOSINOPHIL # BLD AUTO: 168 CELLS/UL (ref 15–500)
EOSINOPHIL NFR BLD AUTO: 2.8 %
ERYTHROCYTE [DISTWIDTH] IN BLOOD BY AUTOMATED COUNT: 12.8 % (ref 11–15)
GLOBULIN SER CALC-MCNC: 2.6 G/DL (CALC) (ref 1.9–3.7)
GLUCOSE SERPL-MCNC: 151 MG/DL (ref 65–99)
HCT VFR BLD AUTO: 38.1 % (ref 38.5–50)
HGB BLD-MCNC: 12.9 G/DL (ref 13.2–17.1)
LYMPHOCYTES # BLD AUTO: 942 CELLS/UL (ref 850–3900)
LYMPHOCYTES NFR BLD AUTO: 15.7 %
MCH RBC QN AUTO: 31.1 PG (ref 27–33)
MCHC RBC AUTO-ENTMCNC: 33.9 G/DL (ref 32–36)
MCV RBC AUTO: 91.8 FL (ref 80–100)
MONOCYTES # BLD AUTO: 492 CELLS/UL (ref 200–950)
MONOCYTES NFR BLD AUTO: 8.2 %
NEUTROPHILS # BLD AUTO: 4338 CELLS/UL (ref 1500–7800)
NEUTROPHILS NFR BLD AUTO: 72.3 %
PLATELET # BLD AUTO: 157 THOUSAND/UL (ref 140–400)
PMV BLD REES-ECKER: 9.4 FL (ref 7.5–12.5)
POTASSIUM SERPL-SCNC: 5 MMOL/L (ref 3.5–5.3)
PROT SERPL-MCNC: 6.6 G/DL (ref 6.1–8.1)
RBC # BLD AUTO: 4.15 MILLION/UL (ref 4.2–5.8)
SL AMB EGFR AFRICAN AMERICAN: 47 ML/MIN/1.73M2
SL AMB EGFR NON AFRICAN AMERICAN: 40 ML/MIN/1.73M2
SODIUM SERPL-SCNC: 143 MMOL/L (ref 135–146)
WBC # BLD AUTO: 6 THOUSAND/UL (ref 3.8–10.8)

## 2018-06-15 ENCOUNTER — TELEPHONE (OUTPATIENT)
Dept: ENDOCRINOLOGY | Facility: CLINIC | Age: 78
End: 2018-06-15

## 2018-06-21 ENCOUNTER — TELEPHONE (OUTPATIENT)
Dept: ENDOCRINOLOGY | Facility: CLINIC | Age: 78
End: 2018-06-21

## 2018-07-20 ENCOUNTER — OFFICE VISIT (OUTPATIENT)
Dept: ENDOCRINOLOGY | Facility: CLINIC | Age: 78
End: 2018-07-20
Payer: MEDICARE

## 2018-07-20 VITALS
WEIGHT: 237 LBS | BODY MASS INDEX: 35.1 KG/M2 | HEIGHT: 69 IN | SYSTOLIC BLOOD PRESSURE: 120 MMHG | DIASTOLIC BLOOD PRESSURE: 62 MMHG

## 2018-07-20 DIAGNOSIS — I10 HYPERTENSION, UNSPECIFIED TYPE: ICD-10-CM

## 2018-07-20 DIAGNOSIS — Z79.4 TYPE 2 DIABETES MELLITUS WITH OTHER OPHTHALMIC COMPLICATION, WITH LONG-TERM CURRENT USE OF INSULIN (HCC): Primary | ICD-10-CM

## 2018-07-20 DIAGNOSIS — E11.39 TYPE 2 DIABETES MELLITUS WITH OTHER OPHTHALMIC COMPLICATION, WITH LONG-TERM CURRENT USE OF INSULIN (HCC): Primary | ICD-10-CM

## 2018-07-20 DIAGNOSIS — E78.5 HYPERLIPIDEMIA, UNSPECIFIED HYPERLIPIDEMIA TYPE: ICD-10-CM

## 2018-07-20 DIAGNOSIS — E03.9 HYPOTHYROIDISM, UNSPECIFIED TYPE: ICD-10-CM

## 2018-07-20 PROCEDURE — 99214 OFFICE O/P EST MOD 30 MIN: CPT | Performed by: INTERNAL MEDICINE

## 2018-07-20 RX ORDER — METOPROLOL SUCCINATE 25 MG/1
TABLET, EXTENDED RELEASE ORAL
COMMUNITY
Start: 2018-06-14 | End: 2018-12-05 | Stop reason: SDUPTHER

## 2018-07-20 RX ORDER — FERROUS SULFATE 325(65) MG
TABLET ORAL EVERY 12 HOURS
COMMUNITY
Start: 2017-10-27 | End: 2018-10-23 | Stop reason: SDUPTHER

## 2018-07-20 RX ORDER — SPIRONOLACTONE 25 MG/1
1 TABLET ORAL 2 TIMES WEEKLY
COMMUNITY
Start: 2018-06-14 | End: 2018-07-26 | Stop reason: SINTOL

## 2018-07-20 RX ORDER — METOLAZONE 2.5 MG/1
TABLET ORAL
COMMUNITY
Start: 2017-11-30 | End: 2019-07-01 | Stop reason: SDUPTHER

## 2018-07-20 NOTE — PROGRESS NOTES
Clark Saravia 69 y o  male MRN: [id number]    Encounter: [id number]      Assessment/Plan     Problem List Items Addressed This Visit     Hyperlipidemia     Continue statins         Relevant Orders    Comprehensive metabolic panel Lab Collect    Hypertension     Blood pressure at goal, continue current regimen         Relevant Medications    metolazone (ZAROXOLYN) 2 5 mg tablet    metoprolol succinate (TOPROL-XL) 25 mg 24 hr tablet    spironolactone (ALDACTONE) 25 mg tablet    Other Relevant Orders    Comprehensive metabolic panel Lab Collect    Hypothyroidism     Continue levothyroxine at current dose         Relevant Medications    metoprolol succinate (TOPROL-XL) 25 mg 24 hr tablet    Other Relevant Orders    Comprehensive metabolic panel Lab Collect    TSH, 3rd generation Lab Collect    T4, free Lab Collect    Type 2 diabetes mellitus with ophthalmic complication, with long-term current use of insulin ([facility]) - Primary     Lab Results   Component Value Date    HGBA1C 6 7 (H) 06/07/2018       No results for input(s): POCGLU in the last 72 hours  Blood Sugar Average: Last 72 hrs:     Sugars fairly stable-continue current regimen, focus on dietary and lifestyle modifications and weight loss         Relevant Medications    glucose blood (FREESTYLE LITE) test strip    Other Relevant Orders    Comprehensive metabolic panel Lab Collect    HEMOGLOBIN A1C W/ EAG ESTIMATION Lab Collect    Microalbumin / creatinine urine ratio Lab Collect        CC: Diabetes    History of Present Illness     HPI:  60-year-old gentleman is with history of type 2 diabetes on long-term insulin therapy, complicated by retinopathy, hypothyroidism, hyperlipidemia hypertension here for follow-up  Currently on basal bolus insulin therapy  Checking blood sugar 4 times a day    + polyuria ,Polyuria - no blurry vision , upto date with optho and podiatry exam      Fasting 120-170s occasional higher   premeal 120-160    No hypoglycemia     No numbness and tingling          Review of Systems   Constitutional: Positive for fatigue  Negative for unexpected weight change  Respiratory: Negative for cough and shortness of breath  Cardiovascular: Negative for chest pain, palpitations and leg swelling  Gastrointestinal: Negative for constipation, diarrhea, nausea and vomiting  Endocrine: Positive for polydipsia and polyuria  Musculoskeletal: Positive for arthralgias and myalgias  Negative for gait problem  Skin: Negative for color change and pallor  Psychiatric/Behavioral: Negative for sleep disturbance  All other systems reviewed and are negative  Historical Information   No past medical history on file  No past surgical history on file    Social History   History   Alcohol Use No     History   Drug Use No     History   Smoking Status    Never Smoker   Smokeless Tobacco    Never Used     Family History:   Family History   Problem Relation Age of Onset    Diabetes unspecified Maternal Grandfather     Diabetes unspecified Paternal Grandfather        Meds/Allergies   Current Outpatient Prescriptions   Medication Sig Dispense Refill    aspirin (ASPIRIN LOW DOSE) 81 MG tablet every 24 hours      atorvastatin (LIPITOR) 10 mg tablet Take 1 tablet by mouth daily      ferrous sulfate 325 (65 Fe) mg tablet Every 12 hours      furosemide (LASIX) 40 mg tablet Take 80 mg by mouth 2 (two) times a day        glucose blood (FREESTYLE LITE) test strip Use 4/day 400 each 3    Insulin Glargine (TOUJEO SOLOSTAR) injection pen 300 units/mL Inject 60 Units under the skin daily 3 pen 0    insulin lispro (HUMALOG KWIKPEN) 100 Units/mL SOPN Inject 60 Units under the skin see administration instructions 16 units with breakfast  20 with lunch  38 with dinner  10 at bedtime if needed if BG over 200 (Patient taking differently: Inject 60 Units under the skin see administration instructions 16 units with breakfast  16 with lunch  42 with dinner  10 at bedtime if needed if BG over 200 ) 5 pen 0    levothyroxine (SYNTHROID) 88 mcg tablet Take 1 tablet by mouth daily      lisinopril (ZESTRIL) 20 mg tablet Take 1 tablet by mouth daily      metolazone (ZAROXOLYN) 2 5 mg tablet take 1 tablet by oral route  every day      metoprolol succinate (TOPROL-XL) 25 mg 24 hr tablet       Multiple Vitamins-Minerals (EYE VITAMINS) CAPS Take 1 capsule by mouth daily      pantoprazole (PROTONIX) 40 mg tablet Take 1 tablet by mouth daily      spironolactone (ALDACTONE) 25 mg tablet Take 1 tablet by mouth daily      tamsulosin (FLOMAX) 0 4 mg Take 1 capsule by mouth daily      calcium carbonate (CALCIUM 600) 600 MG tablet Take 1 tablet by mouth daily      Glucosamine-Chondroitin 500-250 MG CAPS Take 1 capsule by mouth daily      Multiple Vitamins-Minerals (MULTIVITAMIN ADULT PO) Take 1 tablet by mouth daily      Multiple Vitamins-Minerals (PRESERVISION AREDS 2+MULTI VIT PO) Take once daily       No current facility-administered medications for this visit  No Known Allergies    Objective   Vitals: Blood pressure 120/62, height 5' 9" (1 753 m), weight 108 kg (237 lb)  Physical Exam   Constitutional: He appears well-developed and well-nourished  No distress  HENT:   Head: Normocephalic and atraumatic  Mouth/Throat: No oropharyngeal exudate  Eyes: Conjunctivae and EOM are normal  No scleral icterus  Neck: Normal range of motion  Neck supple  No thyromegaly present  Cardiovascular: Normal rate, regular rhythm and normal heart sounds  No murmur heard  Pulmonary/Chest: Effort normal and breath sounds normal  No respiratory distress  He has no wheezes  He has no rales  Abdominal: Soft  Bowel sounds are normal  He exhibits no distension  There is no tenderness  There is no rebound  Musculoskeletal: Normal range of motion  He exhibits no edema or deformity  Lymphadenopathy:     He has no cervical adenopathy  Neurological: He is alert     Skin: Skin is warm and dry  No rash noted  No erythema  No pallor  Psychiatric: He has a normal mood and affect  His behavior is normal  Thought content normal        The history was obtained from the review of the chart, patient  Lab Results:   Lab Results   Component Value Date/Time    Hemoglobin A1C 6 7 (H) 06/07/2018 08:47 AM    Hemoglobin A1C 7 0 (H) 02/23/2018 12:30 PM    Hemoglobin A1C 6 7 (H) 09/18/2017 08:59 AM    Hemoglobin 12 9 (L) 06/07/2018 08:39 AM    Hemoglobin 13 4 02/23/2018 12:30 PM    Hematocrit 38 1 (L) 06/07/2018 08:39 AM    Hematocrit 40 0 02/23/2018 12:30 PM    MCV 91 8 06/07/2018 08:39 AM    MCV 89 9 02/23/2018 12:30 PM    Platelet Count 567 80/52/5336 08:39 AM    Platelet Count 040 63/49/0320 12:30 PM    BUN 81 (H) 06/07/2018 08:39 AM    BUN 75 (H) 02/23/2018 12:30 PM    Creatinine, Serum 1 62 (H) 06/07/2018 08:39 AM    Creatinine, Serum 1 57 (H) 02/23/2018 12:30 PM               Portions of the record may have been created with voice recognition software  Occasional wrong word or "sound a like" substitutions may have occurred due to the inherent limitations of voice recognition software  Read the chart carefully and recognize, using context, where substitutions have occurred

## 2018-07-20 NOTE — ASSESSMENT & PLAN NOTE
Lab Results   Component Value Date    HGBA1C 6 7 (H) 06/07/2018       No results for input(s): POCGLU in the last 72 hours      Blood Sugar Average: Last 72 hrs:     Sugars fairly stable-continue current regimen, focus on dietary and lifestyle modifications and weight loss

## 2018-07-21 LAB
ALBUMIN SERPL-MCNC: 4.2 G/DL (ref 3.6–5.1)
ALBUMIN/GLOB SERPL: 1.6 (CALC) (ref 1–2.5)
ALP SERPL-CCNC: 89 U/L (ref 40–115)
ALT SERPL-CCNC: 16 U/L (ref 9–46)
AST SERPL-CCNC: 16 U/L (ref 10–35)
BASOPHILS # BLD AUTO: 83 CELLS/UL (ref 0–200)
BASOPHILS NFR BLD AUTO: 1.2 %
BILIRUB SERPL-MCNC: 0.6 MG/DL (ref 0.2–1.2)
BUN SERPL-MCNC: 72 MG/DL (ref 7–25)
BUN/CREAT SERPL: 45 (CALC) (ref 6–22)
CALCIUM SERPL-MCNC: 10.2 MG/DL (ref 8.6–10.3)
CHLORIDE SERPL-SCNC: 104 MMOL/L (ref 98–110)
CO2 SERPL-SCNC: 32 MMOL/L (ref 20–31)
CREAT SERPL-MCNC: 1.6 MG/DL (ref 0.7–1.18)
EOSINOPHIL # BLD AUTO: 179 CELLS/UL (ref 15–500)
EOSINOPHIL NFR BLD AUTO: 2.6 %
ERYTHROCYTE [DISTWIDTH] IN BLOOD BY AUTOMATED COUNT: 12.9 % (ref 11–15)
GLOBULIN SER CALC-MCNC: 2.6 G/DL (CALC) (ref 1.9–3.7)
GLUCOSE SERPL-MCNC: 137 MG/DL (ref 65–139)
HCT VFR BLD AUTO: 39.9 % (ref 38.5–50)
HGB BLD-MCNC: 13.3 G/DL (ref 13.2–17.1)
LYMPHOCYTES # BLD AUTO: 1132 CELLS/UL (ref 850–3900)
LYMPHOCYTES NFR BLD AUTO: 16.4 %
MCH RBC QN AUTO: 30.9 PG (ref 27–33)
MCHC RBC AUTO-ENTMCNC: 33.3 G/DL (ref 32–36)
MCV RBC AUTO: 92.6 FL (ref 80–100)
MONOCYTES # BLD AUTO: 593 CELLS/UL (ref 200–950)
MONOCYTES NFR BLD AUTO: 8.6 %
NEUTROPHILS # BLD AUTO: 4913 CELLS/UL (ref 1500–7800)
NEUTROPHILS NFR BLD AUTO: 71.2 %
PLATELET # BLD AUTO: 172 THOUSAND/UL (ref 140–400)
PMV BLD REES-ECKER: 9.5 FL (ref 7.5–12.5)
POTASSIUM SERPL-SCNC: 5 MMOL/L (ref 3.5–5.3)
PROT SERPL-MCNC: 6.8 G/DL (ref 6.1–8.1)
RBC # BLD AUTO: 4.31 MILLION/UL (ref 4.2–5.8)
SL AMB EGFR AFRICAN AMERICAN: 47 ML/MIN/1.73M2
SL AMB EGFR NON AFRICAN AMERICAN: 41 ML/MIN/1.73M2
SODIUM SERPL-SCNC: 143 MMOL/L (ref 135–146)
WBC # BLD AUTO: 6.9 THOUSAND/UL (ref 3.8–10.8)

## 2018-07-25 ENCOUNTER — TELEPHONE (OUTPATIENT)
Dept: ENDOCRINOLOGY | Facility: CLINIC | Age: 78
End: 2018-07-25

## 2018-07-26 ENCOUNTER — OFFICE VISIT (OUTPATIENT)
Dept: FAMILY MEDICINE CLINIC | Facility: CLINIC | Age: 78
End: 2018-07-26
Payer: MEDICARE

## 2018-07-26 VITALS
OXYGEN SATURATION: 94 % | WEIGHT: 239 LBS | SYSTOLIC BLOOD PRESSURE: 128 MMHG | BODY MASS INDEX: 35.29 KG/M2 | RESPIRATION RATE: 15 BRPM | HEART RATE: 70 BPM | TEMPERATURE: 96.3 F | DIASTOLIC BLOOD PRESSURE: 78 MMHG

## 2018-07-26 DIAGNOSIS — E11.39 TYPE 2 DIABETES MELLITUS WITH OTHER OPHTHALMIC COMPLICATION, WITH LONG-TERM CURRENT USE OF INSULIN (HCC): ICD-10-CM

## 2018-07-26 DIAGNOSIS — Z79.4 TYPE 2 DIABETES MELLITUS WITH OTHER OPHTHALMIC COMPLICATION, WITH LONG-TERM CURRENT USE OF INSULIN (HCC): ICD-10-CM

## 2018-07-26 DIAGNOSIS — E03.9 HYPOTHYROIDISM, UNSPECIFIED TYPE: ICD-10-CM

## 2018-07-26 DIAGNOSIS — I50.32 CHRONIC DIASTOLIC CONGESTIVE HEART FAILURE (HCC): ICD-10-CM

## 2018-07-26 DIAGNOSIS — N18.30 STAGE 3 CHRONIC KIDNEY DISEASE (HCC): Primary | ICD-10-CM

## 2018-07-26 PROCEDURE — 99214 OFFICE O/P EST MOD 30 MIN: CPT | Performed by: INTERNAL MEDICINE

## 2018-07-26 RX ORDER — SPIRONOLACTONE 25 MG/1
25 TABLET ORAL 2 TIMES WEEKLY
Qty: 24 TABLET | Refills: 3 | Status: SHIPPED | OUTPATIENT
Start: 2018-07-26 | End: 2018-08-16 | Stop reason: DRUGHIGH

## 2018-07-26 NOTE — PROGRESS NOTES
Assessment/Plan:  decr metalozone to 2 5/wk and expect further decr  decr spirono to 25mg 2x/wk  Bmp 17d and appt 3 weeks for further decr    Diet reviewed  Lifestyle modifications reviewed  Medications reviewed and ordered  Laboratory tests and studies reviewed and ordered  All patient's questions answered to patient satisfaction  Diagnoses and all orders for this visit:    Chronic diastolic congestive heart failure (HCC)  -     spironolactone (ALDACTONE) 25 mg tablet; Take 1 tablet (25 mg total) by mouth 2 (two) times a week  -     CBC and differential; Future  -     Basic metabolic panel; Future    Stage 3 chronic kidney disease  -     CBC and differential; Future  -     Basic metabolic panel; Future    Type 2 diabetes mellitus with other ophthalmic complication, with long-term current use of insulin (HCC)  -     spironolactone (ALDACTONE) 25 mg tablet; Take 1 tablet (25 mg total) by mouth 2 (two) times a week  -     CBC and differential; Future  -     Basic metabolic panel; Future    Hypothyroidism, unspecified type        Subjective:      Patient ID: Gama Zapata is a 69 y o  male  HPI  This 52-year-old male with history of chronic diastolic congestive heart failure, obstructive sleep apnea and central sleep apnea with oxygen dependence, stage 3 chronic kidney disease with BUN of 70 creatinine of 1 6 and a potassium of 5 0  At the last visit we decreased metolo to 2 5 mg on Monday and 1 25 mg on Friday  On furose 80 bid  He is currently on Aldactone 25 mg daily  His weight has remained stable to 137 lb  He has succeeded in restricting his salt intake further  His swelling has not increased and he has virtually no edema in his legs  Hemoglobin stable at 13 3        Current Outpatient Prescriptions:     aspirin (ASPIRIN LOW DOSE) 81 MG tablet, every 24 hours, Disp: , Rfl:     atorvastatin (LIPITOR) 10 mg tablet, Take 1 tablet by mouth daily, Disp: , Rfl:     ferrous sulfate 325 (65 Fe) mg tablet, Every 12 hours, Disp: , Rfl:     furosemide (LASIX) 40 mg tablet, Take 80 mg by mouth 2 (two) times a day  , Disp: , Rfl:     Glucosamine-Chondroitin 500-250 MG CAPS, Take 1 capsule by mouth daily, Disp: , Rfl:     glucose blood (FREESTYLE LITE) test strip, Use 4/day, Disp: 400 each, Rfl: 3    Insulin Glargine (TOUJEO SOLOSTAR) injection pen 300 units/mL, Inject 60 Units under the skin daily, Disp: 3 pen, Rfl: 0    insulin lispro (HUMALOG KWIKPEN) 100 Units/mL SOPN, Inject 60 Units under the skin see administration instructions 16 units with breakfast 20 with lunch 38 with dinner 10 at bedtime if needed if BG over 200 (Patient taking differently: Inject 60 Units under the skin see administration instructions 16 units with breakfast 16 with lunch 42 with dinner 10 at bedtime if needed if BG over 200 ), Disp: 5 pen, Rfl: 0    levothyroxine (SYNTHROID) 88 mcg tablet, Take 1 tablet by mouth daily, Disp: , Rfl:     lisinopril (ZESTRIL) 20 mg tablet, Take 1 tablet by mouth daily, Disp: , Rfl:     metolazone (ZAROXOLYN) 2 5 mg tablet, take 1 tablet by oral route  every day, Disp: , Rfl:     metoprolol succinate (TOPROL-XL) 25 mg 24 hr tablet, , Disp: , Rfl:     Multiple Vitamins-Minerals (EYE VITAMINS) CAPS, Take 1 capsule by mouth daily, Disp: , Rfl:     Multiple Vitamins-Minerals (MULTIVITAMIN ADULT PO), Take 1 tablet by mouth daily, Disp: , Rfl:     pantoprazole (PROTONIX) 40 mg tablet, Take 1 tablet by mouth daily, Disp: , Rfl:     tamsulosin (FLOMAX) 0 4 mg, Take 1 capsule by mouth daily, Disp: , Rfl:     calcium carbonate (CALCIUM 600) 600 MG tablet, Take 1 tablet by mouth daily, Disp: , Rfl:     Multiple Vitamins-Minerals (PRESERVISION AREDS 2+MULTI VIT PO), Take once daily, Disp: , Rfl:     spironolactone (ALDACTONE) 25 mg tablet, Take 1 tablet (25 mg total) by mouth 2 (two) times a week, Disp: 24 tablet, Rfl: 3    The following portions of the patient's history were reviewed and updated as appropriate: allergies, current medications, past family history, past medical history, past social history, past surgical history and problem list     Review of Systems   Constitutional: Negative for appetite change, fatigue, fever and unexpected weight change  HENT: Negative for rhinorrhea, sinus pain, sinus pressure, sneezing and sore throat  Eyes: Negative for visual disturbance  Respiratory: Negative for cough, chest tightness, shortness of breath and wheezing  Cardiovascular: Negative for chest pain, palpitations and leg swelling  Gastrointestinal: Negative for abdominal distention, abdominal pain, blood in stool, constipation, diarrhea, nausea and vomiting  Endocrine: Negative for polydipsia and polyuria  Genitourinary: Negative for decreased urine volume, difficulty urinating, dysuria, hematuria and urgency  Musculoskeletal: Negative for arthralgias, back pain, joint swelling and neck pain  Skin: Negative for rash  Allergic/Immunologic: Negative for environmental allergies  Neurological: Negative for tremors, weakness, light-headedness, numbness and headaches  Hematological: Does not bruise/bleed easily  Psychiatric/Behavioral: Negative for agitation, behavioral problems, confusion and dysphoric mood  The patient is not nervous/anxious  Family History   Problem Relation Age of Onset    Diabetes unspecified Maternal Grandfather     Diabetes unspecified Paternal Grandfather        Past Medical History:   Diagnosis Date    Aortic stenosis     Benign hypertension     Chronic diastolic heart failure (HCC)     Diabetes (HCC)     GERD (gastroesophageal reflux disease)     Hypothyroidism     Obstructive sleep apnea syndrome     Prostatism     Vitamin D deficiency        History reviewed  No pertinent surgical history      Social History     Social History    Marital status: /Civil Union     Spouse name: N/A    Number of children: N/A    Years of education: N/A Social History Main Topics    Smoking status: Never Smoker    Smokeless tobacco: Never Used    Alcohol use No    Drug use: No    Sexual activity: Not Asked     Other Topics Concern    None     Social History Narrative    None       No Known Allergies      Objective:      /78 (BP Location: Left arm, Patient Position: Sitting, Cuff Size: Adult)   Pulse 70   Temp (!) 96 3 °F (35 7 °C) (Tympanic)   Resp 15   Wt 108 kg (239 lb)   SpO2 94%   BMI 35 29 kg/m²        Physical Exam   Constitutional: He is oriented to person, place, and time  He appears well-developed and well-nourished  No distress  HENT:   Head: Normocephalic and atraumatic  Nose: Nose normal    Mouth/Throat: Oropharynx is clear and moist  No oropharyngeal exudate  Eyes: Conjunctivae and EOM are normal  Pupils are equal, round, and reactive to light  No scleral icterus  Neck: Normal range of motion  Neck supple  No JVD present  No tracheal deviation present  No thyromegaly present  Cardiovascular: Normal rate and regular rhythm  Exam reveals no gallop and no friction rub  Murmur heard  Pulmonary/Chest: Effort normal  No respiratory distress  He has no wheezes  He has no rales  He exhibits no tenderness  Abdominal: Soft  Bowel sounds are normal  He exhibits no distension and no mass  There is no tenderness  There is no rebound and no guarding  Musculoskeletal: Normal range of motion  He exhibits no edema or deformity  Lymphadenopathy:     He has no cervical adenopathy  Neurological: He is alert and oriented to person, place, and time  No cranial nerve deficit  Coordination normal    Skin: Skin is warm and dry  No rash noted  Psychiatric: He has a normal mood and affect   His behavior is normal  Judgment and thought content normal

## 2018-08-02 DIAGNOSIS — Z79.4 TYPE 2 DIABETES MELLITUS WITH OTHER OPHTHALMIC COMPLICATION, WITH LONG-TERM CURRENT USE OF INSULIN (HCC): ICD-10-CM

## 2018-08-02 DIAGNOSIS — R34 URINE OUTPUT LOW: Primary | ICD-10-CM

## 2018-08-02 DIAGNOSIS — E11.39 TYPE 2 DIABETES MELLITUS WITH OTHER OPHTHALMIC COMPLICATION, WITH LONG-TERM CURRENT USE OF INSULIN (HCC): ICD-10-CM

## 2018-08-02 RX ORDER — INSULIN LISPRO 100 [IU]/ML
INJECTION, SOLUTION INTRAVENOUS; SUBCUTANEOUS
Qty: 105 ML | Refills: 1 | Status: SHIPPED | OUTPATIENT
Start: 2018-08-02 | End: 2018-10-30 | Stop reason: SDUPTHER

## 2018-08-02 RX ORDER — TAMSULOSIN HYDROCHLORIDE 0.4 MG/1
CAPSULE ORAL
Qty: 90 CAPSULE | Refills: 2 | Status: SHIPPED | OUTPATIENT
Start: 2018-08-02 | End: 2019-09-18 | Stop reason: SDUPTHER

## 2018-08-07 ENCOUNTER — TELEPHONE (OUTPATIENT)
Dept: ENDOCRINOLOGY | Facility: CLINIC | Age: 78
End: 2018-08-07

## 2018-08-14 LAB
BASOPHILS # BLD AUTO: 62 CELLS/UL (ref 0–200)
BASOPHILS NFR BLD AUTO: 1 %
BUN SERPL-MCNC: 53 MG/DL (ref 7–25)
BUN/CREAT SERPL: 37 (CALC) (ref 6–22)
CALCIUM SERPL-MCNC: 10 MG/DL (ref 8.6–10.3)
CHLORIDE SERPL-SCNC: 102 MMOL/L (ref 98–110)
CO2 SERPL-SCNC: 31 MMOL/L (ref 20–32)
CREAT SERPL-MCNC: 1.42 MG/DL (ref 0.7–1.18)
EOSINOPHIL # BLD AUTO: 198 CELLS/UL (ref 15–500)
EOSINOPHIL NFR BLD AUTO: 3.2 %
ERYTHROCYTE [DISTWIDTH] IN BLOOD BY AUTOMATED COUNT: 13 % (ref 11–15)
GLUCOSE SERPL-MCNC: 120 MG/DL (ref 65–99)
HCT VFR BLD AUTO: 39.9 % (ref 38.5–50)
HGB BLD-MCNC: 13.3 G/DL (ref 13.2–17.1)
LYMPHOCYTES # BLD AUTO: 1091 CELLS/UL (ref 850–3900)
LYMPHOCYTES NFR BLD AUTO: 17.6 %
MCH RBC QN AUTO: 31 PG (ref 27–33)
MCHC RBC AUTO-ENTMCNC: 33.3 G/DL (ref 32–36)
MCV RBC AUTO: 93 FL (ref 80–100)
MONOCYTES # BLD AUTO: 546 CELLS/UL (ref 200–950)
MONOCYTES NFR BLD AUTO: 8.8 %
NEUTROPHILS # BLD AUTO: 4303 CELLS/UL (ref 1500–7800)
NEUTROPHILS NFR BLD AUTO: 69.4 %
PLATELET # BLD AUTO: 155 THOUSAND/UL (ref 140–400)
PMV BLD REES-ECKER: 9.5 FL (ref 7.5–12.5)
POTASSIUM SERPL-SCNC: 4.8 MMOL/L (ref 3.5–5.3)
RBC # BLD AUTO: 4.29 MILLION/UL (ref 4.2–5.8)
SL AMB EGFR AFRICAN AMERICAN: 55 ML/MIN/1.73M2
SL AMB EGFR NON AFRICAN AMERICAN: 47 ML/MIN/1.73M2
SODIUM SERPL-SCNC: 142 MMOL/L (ref 135–146)
WBC # BLD AUTO: 6.2 THOUSAND/UL (ref 3.8–10.8)

## 2018-08-16 ENCOUNTER — OFFICE VISIT (OUTPATIENT)
Dept: FAMILY MEDICINE CLINIC | Facility: CLINIC | Age: 78
End: 2018-08-16
Payer: MEDICARE

## 2018-08-16 VITALS
RESPIRATION RATE: 18 BRPM | HEART RATE: 69 BPM | OXYGEN SATURATION: 97 % | SYSTOLIC BLOOD PRESSURE: 140 MMHG | TEMPERATURE: 97.6 F | WEIGHT: 239.8 LBS | BODY MASS INDEX: 35.41 KG/M2 | DIASTOLIC BLOOD PRESSURE: 80 MMHG

## 2018-08-16 DIAGNOSIS — E78.5 HYPERLIPIDEMIA, UNSPECIFIED HYPERLIPIDEMIA TYPE: ICD-10-CM

## 2018-08-16 DIAGNOSIS — G47.33 OBSTRUCTIVE SLEEP APNEA SYNDROME: ICD-10-CM

## 2018-08-16 DIAGNOSIS — I50.32 CHRONIC DIASTOLIC CONGESTIVE HEART FAILURE (HCC): ICD-10-CM

## 2018-08-16 DIAGNOSIS — Z79.4 TYPE 2 DIABETES MELLITUS WITH OTHER OPHTHALMIC COMPLICATION, WITH LONG-TERM CURRENT USE OF INSULIN (HCC): ICD-10-CM

## 2018-08-16 DIAGNOSIS — I35.0 AORTIC STENOSIS, MODERATE: ICD-10-CM

## 2018-08-16 DIAGNOSIS — I10 HYPERTENSION, UNSPECIFIED TYPE: ICD-10-CM

## 2018-08-16 DIAGNOSIS — E03.9 HYPOTHYROIDISM, UNSPECIFIED TYPE: ICD-10-CM

## 2018-08-16 DIAGNOSIS — N40.0 BENIGN PROSTATIC HYPERPLASIA, UNSPECIFIED WHETHER LOWER URINARY TRACT SYMPTOMS PRESENT: ICD-10-CM

## 2018-08-16 DIAGNOSIS — I87.2 VENOUS INSUFFICIENCY: ICD-10-CM

## 2018-08-16 DIAGNOSIS — E11.39 TYPE 2 DIABETES MELLITUS WITH OTHER OPHTHALMIC COMPLICATION, WITH LONG-TERM CURRENT USE OF INSULIN (HCC): ICD-10-CM

## 2018-08-16 DIAGNOSIS — N18.31 CHRONIC KIDNEY DISEASE (CKD) STAGE G3A/A1, MODERATELY DECREASED GLOMERULAR FILTRATION RATE (GFR) BETWEEN 45-59 ML/MIN/1.73 SQUARE METER AND ALBUMINURIA CREATININE RATIO LESS THAN 30 MG/G (HCC): Primary | ICD-10-CM

## 2018-08-16 DIAGNOSIS — K21.9 GASTROESOPHAGEAL REFLUX DISEASE WITHOUT ESOPHAGITIS: ICD-10-CM

## 2018-08-16 PROBLEM — N17.9 AKI (ACUTE KIDNEY INJURY) (HCC): Status: RESOLVED | Noted: 2017-01-11 | Resolved: 2018-08-16

## 2018-08-16 PROCEDURE — 99214 OFFICE O/P EST MOD 30 MIN: CPT | Performed by: INTERNAL MEDICINE

## 2018-08-16 NOTE — PROGRESS NOTES
Assessment/Plan:  Stop low dose spirono, k4 8, cont furose 80bid and metolo 2 5mg q mon  Bun/cr/gfr improved to 53/1 4/47 with no sig wt gain or incr edemamore than mild  Explained ckd3 related to need for high dose diuretics to control edema which has improved  Could consider us kidneys but obesity will limit study, no sig microalbuminuria 9/17 and due for another   Diet reviewed  Lifestyle modifications reviewed  Medications reviewed and ordered  Laboratory tests and studies reviewed and ordered  All patient's questions answered to patient satisfaction  Diagnoses and all orders for this visit:    Chronic kidney disease (CKD) stage G3a/A1, moderately decreased glomerular filtration rate (GFR) between 45-59 mL/min/1 73 square meter and albuminuria creatinine ratio less than 30 mg/g  -     CBC and differential; Future  -     Comprehensive metabolic panel; Future    Type 2 diabetes mellitus with other ophthalmic complication, with long-term current use of insulin (HCC)    Hypothyroidism, unspecified type  -     TSH, 3rd generation with Free T4 reflex; Future    Hypertension, unspecified type    Gastroesophageal reflux disease without esophagitis    Obstructive sleep apnea syndrome    Aortic stenosis, moderate    Chronic diastolic congestive heart failure (HCC)    Venous insufficiency    Benign prostatic hyperplasia, unspecified whether lower urinary tract symptoms present    Hyperlipidemia, unspecified hyperlipidemia type        Subjective:      Patient ID: Maria Eugenia Ulloa is a 69 y o  male  HPI  69yom with  Chronic edema and venous stasis improved and no worse with decr in diuretics metol from 2 5 2x/week to 1x/week  Bun and cr improved as in assessment  gfr now 47  K 4 8 on spirono 25 2x/week and will need to stop  Wearing O2  On bipap hs      Current Outpatient Prescriptions:     aspirin (ASPIRIN LOW DOSE) 81 MG tablet, every 24 hours, Disp: , Rfl:     atorvastatin (LIPITOR) 10 mg tablet, Take 1 tablet by mouth daily, Disp: , Rfl:     calcium carbonate (CALCIUM 600) 600 MG tablet, Take 1 tablet by mouth daily, Disp: , Rfl:     ferrous sulfate 325 (65 Fe) mg tablet, Every 12 hours, Disp: , Rfl:     furosemide (LASIX) 40 mg tablet, Take 80 mg by mouth 2 (two) times a day  , Disp: , Rfl:     glucose blood (FREESTYLE LITE) test strip, Use 4/day, Disp: 400 each, Rfl: 3    HUMALOG KWIKPEN 100 units/mL injection pen, INJECT 16 UNITS BEFORE  BREAKFAST, 25 UNITS BEFORE  LUNCH, 45 UNITS BEFORE  SUPPER, AND 20 UNITS BEFORE BEDTIME, Disp: 105 mL, Rfl: 1    Insulin Glargine (TOUJEO SOLOSTAR) injection pen 300 units/mL, Inject 60 Units under the skin daily, Disp: 3 pen, Rfl: 0    levothyroxine (SYNTHROID) 88 mcg tablet, Take 1 tablet by mouth daily, Disp: , Rfl:     lisinopril (ZESTRIL) 20 mg tablet, Take 1 tablet by mouth daily, Disp: , Rfl:     metolazone (ZAROXOLYN) 2 5 mg tablet, take 1 tablet by oral route  every week, Disp: , Rfl:     metoprolol succinate (TOPROL-XL) 25 mg 24 hr tablet, , Disp: , Rfl:     Multiple Vitamins-Minerals (EYE VITAMINS) CAPS, Take 1 capsule by mouth daily, Disp: , Rfl:     Multiple Vitamins-Minerals (MULTIVITAMIN ADULT PO), Take 1 tablet by mouth daily, Disp: , Rfl:     Multiple Vitamins-Minerals (PRESERVISION AREDS 2+MULTI VIT PO), Take once daily, Disp: , Rfl:     pantoprazole (PROTONIX) 40 mg tablet, Take 1 tablet by mouth daily, Disp: , Rfl:     tamsulosin (FLOMAX) 0 4 mg, TAKE 1 CAPSULE BY MOUTH  EVERY DAY 1/2 HOUR  FOLLOWING THE SAME MEAL  EACH DAY, Disp: 90 capsule, Rfl: 2    The following portions of the patient's history were reviewed and updated as appropriate: allergies, current medications, past family history, past medical history, past social history, past surgical history and problem list     Review of Systems   Constitutional: Negative for appetite change, fatigue, fever and unexpected weight change     HENT: Negative for rhinorrhea, sinus pain, sinus pressure, sneezing and sore throat  Eyes: Negative for visual disturbance  Respiratory: Negative for cough, chest tightness, shortness of breath and wheezing  Cardiovascular: Negative for chest pain, palpitations and leg swelling  Gastrointestinal: Negative for abdominal distention, abdominal pain, blood in stool, constipation, diarrhea, nausea and vomiting  Endocrine: Negative for polydipsia and polyuria  Genitourinary: Negative for decreased urine volume, difficulty urinating, dysuria, hematuria and urgency  Musculoskeletal: Negative for arthralgias, back pain, joint swelling and neck pain  Skin: Negative for rash  Allergic/Immunologic: Negative for environmental allergies  Neurological: Negative for tremors, weakness, light-headedness, numbness and headaches  Hematological: Does not bruise/bleed easily  Psychiatric/Behavioral: Negative for agitation, behavioral problems, confusion and dysphoric mood  The patient is not nervous/anxious            Family History   Problem Relation Age of Onset    Diabetes type II Maternal Grandfather     Diabetes type II Paternal Grandfather     Coronary artery disease Father     Kidney disease Sister     Ovarian cancer Maternal Grandmother        Past Medical History:   Diagnosis Date    Aortic stenosis     Benign hypertension     Chronic diastolic heart failure (HCC)     Colon polyps     Diabetes (HCC)     Diverticulitis     GERD (gastroesophageal reflux disease)     Hypertension     Hypothyroidism     Obstructive sleep apnea syndrome     Prostatism     Sleep apnea     Stomach ulcer     Stroke ([facility] [address] [facility]     Thyroid disease     Vitamin D deficiency        Past Surgical History:   Procedure Laterality Date    CATARACT EXTRACTION      REPLACEMENT TOTAL KNEE      SINUS SURGERY      with deviated septum       Social History     Social History    Marital status: /Civil Union     Spouse name: N/A    Number of children: N/A    Years of education: N/A     Social History Main Topics    Smoking status: Former Smoker     Types: Cigarettes     Quit date: 1981    Smokeless tobacco: Never Used    Alcohol use No    Drug use: No    Sexual activity: Not Asked     Other Topics Concern    None     Social History Narrative    None       No Known Allergies      Objective:      /80 (BP Location: Right arm, Patient Position: Sitting, Cuff Size: Standard)   Pulse 69   Temp 97 6 °F (36 4 °C) (Tympanic)   Resp 18   Wt 109 kg (239 lb 12 8 oz)   SpO2 97%   BMI 35 41 kg/m²        Physical Exam   Constitutional: He is oriented to person, place, and time  He appears well-developed and well-nourished  No distress  HENT:   Head: Normocephalic and atraumatic  Nose: Nose normal    Mouth/Throat: Oropharynx is clear and moist  No oropharyngeal exudate  Eyes: Conjunctivae and EOM are normal  Pupils are equal, round, and reactive to light  No scleral icterus  Neck: Normal range of motion  Neck supple  No JVD present  No tracheal deviation present  No thyromegaly present  Cardiovascular: Normal rate and regular rhythm  Exam reveals no gallop and no friction rub  Murmur heard  Pulmonary/Chest: Effort normal  No respiratory distress  He has no wheezes  He has no rales  He exhibits no tenderness  Abdominal: Soft  Bowel sounds are normal  He exhibits no distension and no mass  There is no tenderness  There is no rebound and no guarding  Musculoskeletal: Normal range of motion  He exhibits no edema or deformity  Plus one edema pretibial   Lymphadenopathy:     He has no cervical adenopathy  Neurological: He is alert and oriented to person, place, and time  No cranial nerve deficit  Coordination normal    Skin: Skin is warm and dry  No rash noted  Psychiatric: He has a normal mood and affect   His behavior is normal  Judgment and thought content normal

## 2018-08-28 ENCOUNTER — TELEPHONE (OUTPATIENT)
Dept: ENDOCRINOLOGY | Facility: CLINIC | Age: 78
End: 2018-08-28

## 2018-09-10 DIAGNOSIS — K21.9 GASTROESOPHAGEAL REFLUX DISEASE WITHOUT ESOPHAGITIS: Primary | ICD-10-CM

## 2018-09-10 DIAGNOSIS — R34 URINE OUTPUT LOW: ICD-10-CM

## 2018-09-10 RX ORDER — PANTOPRAZOLE SODIUM 40 MG/1
40 TABLET, DELAYED RELEASE ORAL DAILY
Qty: 30 TABLET | Refills: 3 | Status: SHIPPED | OUTPATIENT
Start: 2018-09-10 | End: 2018-10-23 | Stop reason: SDUPTHER

## 2018-09-10 RX ORDER — TAMSULOSIN HYDROCHLORIDE 0.4 MG/1
0.4 CAPSULE ORAL
Qty: 90 CAPSULE | Refills: 3 | Status: SHIPPED | OUTPATIENT
Start: 2018-09-10 | End: 2018-09-13 | Stop reason: SDUPTHER

## 2018-09-10 NOTE — TELEPHONE ENCOUNTER
Patient called and requested a medication refill for Pantoprazole 40 mg tablets- 1 tablet daily, also Tamsulosin 0 4 mg capsules-1 capsule 1/2 hour following the same meal each day  He would like it called into [facility]

## 2018-09-11 LAB
ALBUMIN SERPL-MCNC: 3.8 G/DL (ref 3.6–5.1)
ALBUMIN/GLOB SERPL: 1.6 (CALC) (ref 1–2.5)
ALP SERPL-CCNC: 85 U/L (ref 40–115)
ALT SERPL-CCNC: 16 U/L (ref 9–46)
AST SERPL-CCNC: 18 U/L (ref 10–35)
BASOPHILS # BLD AUTO: 53 CELLS/UL (ref 0–200)
BASOPHILS NFR BLD AUTO: 0.9 %
BILIRUB SERPL-MCNC: 0.5 MG/DL (ref 0.2–1.2)
BUN SERPL-MCNC: 48 MG/DL (ref 7–25)
BUN/CREAT SERPL: 38 (CALC) (ref 6–22)
CALCIUM SERPL-MCNC: 9.8 MG/DL (ref 8.6–10.3)
CHLORIDE SERPL-SCNC: 105 MMOL/L (ref 98–110)
CO2 SERPL-SCNC: 32 MMOL/L (ref 20–32)
CREAT SERPL-MCNC: 1.25 MG/DL (ref 0.7–1.18)
EOSINOPHIL # BLD AUTO: 153 CELLS/UL (ref 15–500)
EOSINOPHIL NFR BLD AUTO: 2.6 %
ERYTHROCYTE [DISTWIDTH] IN BLOOD BY AUTOMATED COUNT: 13.1 % (ref 11–15)
GLOBULIN SER CALC-MCNC: 2.4 G/DL (CALC) (ref 1.9–3.7)
GLUCOSE SERPL-MCNC: 140 MG/DL (ref 65–99)
HCT VFR BLD AUTO: 41.1 % (ref 38.5–50)
HGB BLD-MCNC: 13.6 G/DL (ref 13.2–17.1)
LYMPHOCYTES # BLD AUTO: 897 CELLS/UL (ref 850–3900)
LYMPHOCYTES NFR BLD AUTO: 15.2 %
MCH RBC QN AUTO: 30.7 PG (ref 27–33)
MCHC RBC AUTO-ENTMCNC: 33.1 G/DL (ref 32–36)
MCV RBC AUTO: 92.8 FL (ref 80–100)
MONOCYTES # BLD AUTO: 496 CELLS/UL (ref 200–950)
MONOCYTES NFR BLD AUTO: 8.4 %
NEUTROPHILS # BLD AUTO: 4301 CELLS/UL (ref 1500–7800)
NEUTROPHILS NFR BLD AUTO: 72.9 %
PLATELET # BLD AUTO: 159 THOUSAND/UL (ref 140–400)
PMV BLD REES-ECKER: 9.8 FL (ref 7.5–12.5)
POTASSIUM SERPL-SCNC: 4.7 MMOL/L (ref 3.5–5.3)
PROT SERPL-MCNC: 6.2 G/DL (ref 6.1–8.1)
RBC # BLD AUTO: 4.43 MILLION/UL (ref 4.2–5.8)
SL AMB EGFR AFRICAN AMERICAN: 64 ML/MIN/1.73M2
SL AMB EGFR NON AFRICAN AMERICAN: 55 ML/MIN/1.73M2
SODIUM SERPL-SCNC: 145 MMOL/L (ref 135–146)
TSH SERPL-ACNC: 2.11 MIU/L (ref 0.4–4.5)
WBC # BLD AUTO: 5.9 THOUSAND/UL (ref 3.8–10.8)

## 2018-09-13 RX ORDER — FUROSEMIDE 80 MG
TABLET ORAL
COMMUNITY
Start: 2018-09-10 | End: 2018-09-13 | Stop reason: SDUPTHER

## 2018-09-13 RX ORDER — ATORVASTATIN CALCIUM 40 MG/1
TABLET, FILM COATED ORAL
COMMUNITY
Start: 2018-09-10 | End: 2018-09-13 | Stop reason: SDUPTHER

## 2018-09-14 ENCOUNTER — OFFICE VISIT (OUTPATIENT)
Dept: FAMILY MEDICINE CLINIC | Facility: CLINIC | Age: 78
End: 2018-09-14
Payer: MEDICARE

## 2018-09-14 VITALS
WEIGHT: 242 LBS | HEART RATE: 66 BPM | DIASTOLIC BLOOD PRESSURE: 64 MMHG | HEIGHT: 67 IN | OXYGEN SATURATION: 94 % | SYSTOLIC BLOOD PRESSURE: 138 MMHG | BODY MASS INDEX: 37.98 KG/M2

## 2018-09-14 DIAGNOSIS — G47.33 OBSTRUCTIVE SLEEP APNEA SYNDROME: ICD-10-CM

## 2018-09-14 DIAGNOSIS — I35.0 AORTIC VALVE STENOSIS, ETIOLOGY OF CARDIAC VALVE DISEASE UNSPECIFIED: ICD-10-CM

## 2018-09-14 DIAGNOSIS — N18.2 STAGE 2 CHRONIC KIDNEY DISEASE: ICD-10-CM

## 2018-09-14 DIAGNOSIS — J96.11 CHRONIC RESPIRATORY FAILURE WITH HYPOXIA AND HYPERCAPNIA (HCC): ICD-10-CM

## 2018-09-14 DIAGNOSIS — I10 HYPERTENSION, UNSPECIFIED TYPE: Primary | ICD-10-CM

## 2018-09-14 DIAGNOSIS — E03.9 HYPOTHYROIDISM, UNSPECIFIED TYPE: ICD-10-CM

## 2018-09-14 DIAGNOSIS — E55.9 VITAMIN D DEFICIENCY: ICD-10-CM

## 2018-09-14 DIAGNOSIS — E66.01 CLASS 3 SEVERE OBESITY DUE TO EXCESS CALORIES WITH SERIOUS COMORBIDITY AND BODY MASS INDEX (BMI) OF 40.0 TO 44.9 IN ADULT (HCC): ICD-10-CM

## 2018-09-14 DIAGNOSIS — E78.5 HYPERLIPIDEMIA, UNSPECIFIED HYPERLIPIDEMIA TYPE: ICD-10-CM

## 2018-09-14 DIAGNOSIS — E11.39 TYPE 2 DIABETES MELLITUS WITH OTHER OPHTHALMIC COMPLICATION, WITH LONG-TERM CURRENT USE OF INSULIN (HCC): ICD-10-CM

## 2018-09-14 DIAGNOSIS — Z79.4 TYPE 2 DIABETES MELLITUS WITH OTHER OPHTHALMIC COMPLICATION, WITH LONG-TERM CURRENT USE OF INSULIN (HCC): ICD-10-CM

## 2018-09-14 DIAGNOSIS — J96.12 CHRONIC RESPIRATORY FAILURE WITH HYPOXIA AND HYPERCAPNIA (HCC): ICD-10-CM

## 2018-09-14 PROCEDURE — 99214 OFFICE O/P EST MOD 30 MIN: CPT | Performed by: INTERNAL MEDICINE

## 2018-09-18 PROBLEM — I50.30 (HFPEF) HEART FAILURE WITH PRESERVED EJECTION FRACTION (HCC): Status: ACTIVE | Noted: 2017-01-11

## 2018-09-18 PROBLEM — N18.2 STAGE 2 CHRONIC KIDNEY DISEASE: Status: ACTIVE | Noted: 2018-07-26

## 2018-09-18 NOTE — PROGRESS NOTES
Assessment/Plan:  The patient has slowly decreased his diuretic dose with minimal weight change perhaps 1 or 2 lb and well controlled leg edema +1 on the left pretibial and trace on the right  BUN is 48 creatinine is 1 25 with the estimated GFR of 55 he is now in the class 2 chronic kidney disease category although he still does have significant pre renal azotemia is much more moderate with a decrease in his diuretics and his attention to salt and water restriction  Diet reviewed  Lifestyle modifications reviewed  Medications reviewed and ordered  Laboratory tests and studies reviewed and ordered  All patient's questions answered to patient satisfaction  Diagnoses and all orders for this visit:    Hypertension, unspecified type    Stage 2 chronic kidney disease    Aortic valve stenosis, etiology of cardiac valve disease unspecified  -     CBC and differential; Future  -     Basic metabolic panel; Future    Class 3 severe obesity due to excess calories with serious comorbidity and body mass index (BMI) of 40 0 to 44 9 in [facility])    Vitamin D deficiency    Hyperlipidemia, unspecified hyperlipidemia type    Type 2 diabetes mellitus with other ophthalmic complication, with long-term current use of insulin (HCC)    Hypothyroidism, unspecified type    Obstructive sleep apnea syndrome    Chronic respiratory failure with hypoxia and hypercapnia (HCC)    Other orders  -     Discontinue: atorvastatin (LIPITOR) 40 mg tablet;   -     Discontinue: furosemide (LASIX) 80 mg tablet;         Subjective:      Patient ID: Tianna Donato is a 69 y o  male  HPI  This 50-year-old male with cor pulmonale complex obstructive and central sleep apnea requiring nasal oxygen insulin dependent diabetes mellitus and edema and chronic venous stasis requiring careful attention to his diuretic therapy  The etiology of this is complicated by cor pulmonale and diastolic heart failure    Further discussions present in the assessment patient's successfully cut back on his diuretics he with only 1 or 2 lb of weight gain and will continue on this regime    Laboratory studies are reviewed above in the assessment    Current Outpatient Prescriptions:     aspirin (ASPIRIN LOW DOSE) 81 MG tablet, every 24 hours, Disp: , Rfl:     atorvastatin (LIPITOR) 10 mg tablet, Take 1 tablet by mouth daily, Disp: , Rfl:     ferrous sulfate 325 (65 Fe) mg tablet, Every 12 hours, Disp: , Rfl:     furosemide (LASIX) 40 mg tablet, Take 80 mg by mouth 2 (two) times a day  , Disp: , Rfl:     glucose blood (FREESTYLE LITE) test strip, Use 4/day, Disp: 400 each, Rfl: 3    HUMALOG KWIKPEN 100 units/mL injection pen, INJECT 16 UNITS BEFORE  BREAKFAST, 25 UNITS BEFORE  LUNCH, 45 UNITS BEFORE  SUPPER, AND 20 UNITS BEFORE BEDTIME, Disp: 105 mL, Rfl: 1    Insulin Glargine (TOUJEO SOLOSTAR) injection pen 300 units/mL, Inject 60 Units under the skin daily, Disp: 3 pen, Rfl: 0    levothyroxine (SYNTHROID) 88 mcg tablet, Take 1 tablet by mouth daily, Disp: , Rfl:     lisinopril (ZESTRIL) 20 mg tablet, Take 1 tablet by mouth daily, Disp: , Rfl:     metolazone (ZAROXOLYN) 2 5 mg tablet, take 1 tablet by oral route  every week, Disp: , Rfl:     metoprolol succinate (TOPROL-XL) 25 mg 24 hr tablet, , Disp: , Rfl:     Multiple Vitamins-Minerals (EYE VITAMINS) CAPS, Take 1 capsule by mouth daily, Disp: , Rfl:     Multiple Vitamins-Minerals (MULTIVITAMIN ADULT PO), Take 1 tablet by mouth daily, Disp: , Rfl:     pantoprazole (PROTONIX) 40 mg tablet, Take 1 tablet (40 mg total) by mouth daily, Disp: 30 tablet, Rfl: 3    tamsulosin (FLOMAX) 0 4 mg, TAKE 1 CAPSULE BY MOUTH  EVERY DAY 1/2 HOUR  FOLLOWING THE SAME MEAL  EACH DAY, Disp: 90 capsule, Rfl: 2    calcium carbonate (CALCIUM 600) 600 MG tablet, Take 1 tablet by mouth daily, Disp: , Rfl:     The following portions of the patient's history were reviewed and updated as appropriate: allergies, current medications, past family history, past medical history, past social history, past surgical history and problem list     Review of Systems   Constitutional: Negative for appetite change, fatigue, fever and unexpected weight change  HENT: Negative for rhinorrhea, sinus pain, sinus pressure, sneezing and sore throat  Eyes: Negative for visual disturbance  Respiratory: Negative for cough, chest tightness, shortness of breath and wheezing  Cardiovascular: Negative for chest pain, palpitations and leg swelling  Gastrointestinal: Negative for abdominal distention, abdominal pain, blood in stool, constipation, diarrhea, nausea and vomiting  Endocrine: Negative for polydipsia and polyuria  Genitourinary: Negative for decreased urine volume, difficulty urinating, dysuria, hematuria and urgency  Musculoskeletal: Negative for arthralgias, back pain, joint swelling and neck pain  Skin: Negative for rash  Allergic/Immunologic: Negative for environmental allergies  Neurological: Negative for tremors, weakness, light-headedness, numbness and headaches  Hematological: Does not bruise/bleed easily  Psychiatric/Behavioral: Negative for agitation, behavioral problems, confusion and dysphoric mood  The patient is not nervous/anxious            Family History   Problem Relation Age of Onset    Diabetes type II Maternal Grandfather     Diabetes type II Paternal Grandfather     Coronary artery disease Father     Kidney disease Sister     Ovarian cancer Maternal Grandmother        Past Medical History:   Diagnosis Date    Aortic stenosis     Benign hypertension     Chronic diastolic heart failure (HCC)     Colon polyps     Diabetes ([facility] )     Diverticulitis     GERD (gastroesophageal reflux disease)     Hypertension     Hypothyroidism     Obstructive sleep apnea syndrome     Prostatism     Sleep apnea     Stomach ulcer     Stroke ([facility] )     Thyroid disease     Vitamin D deficiency        Past Surgical History: Procedure Laterality Date    CATARACT EXTRACTION      REPLACEMENT TOTAL KNEE      SINUS SURGERY      with deviated septum       Social History     Social History    Marital status: /Civil Union     Spouse name: N/A    Number of children: N/A    Years of education: N/A     Social History Main Topics    Smoking status: Former Smoker     Types: Cigarettes     Quit date: 1981    Smokeless tobacco: Never Used    Alcohol use No    Drug use: No    Sexual activity: Not Asked     Other Topics Concern    None     Social History Narrative    None       No Known Allergies      Objective:      /64 (BP Location: Left arm, Patient Position: Sitting, Cuff Size: Standard)   Pulse 66   Ht 5' 7" (1 702 m)   Wt 110 kg (242 lb)   SpO2 94%   BMI 37 90 kg/m²        Physical Exam   Constitutional: He is oriented to person, place, and time  He appears well-developed and well-nourished  No distress  HENT:   Head: Normocephalic and atraumatic  Nose: Nose normal    Mouth/Throat: Oropharynx is clear and moist  No oropharyngeal exudate  Eyes: Conjunctivae and EOM are normal  Pupils are equal, round, and reactive to light  No scleral icterus  Neck: Normal range of motion  Neck supple  No JVD present  No tracheal deviation present  No thyromegaly present  Cardiovascular: Normal rate, regular rhythm and normal heart sounds  Exam reveals no gallop and no friction rub  No murmur heard  Pulmonary/Chest: Effort normal  No respiratory distress  He has no wheezes  He has no rales  He exhibits no tenderness  Abdominal: Soft  Bowel sounds are normal  He exhibits no distension and no mass  There is no tenderness  There is no rebound and no guarding  Musculoskeletal: Normal range of motion  He exhibits edema  He exhibits no deformity  Lymphadenopathy:     He has no cervical adenopathy  Neurological: He is alert and oriented to person, place, and time  No cranial nerve deficit   Coordination normal  Skin: Skin is warm and dry  No rash noted  Psychiatric: He has a normal mood and affect   His behavior is normal  Judgment and thought content normal

## 2018-09-26 ENCOUNTER — TELEPHONE (OUTPATIENT)
Dept: ENDOCRINOLOGY | Facility: CLINIC | Age: 78
End: 2018-09-26

## 2018-10-08 ENCOUNTER — TELEPHONE (OUTPATIENT)
Dept: ENDOCRINOLOGY | Facility: CLINIC | Age: 78
End: 2018-10-08

## 2018-10-16 LAB
ALBUMIN SERPL-MCNC: 3.9 G/DL (ref 3.6–5.1)
ALBUMIN/GLOB SERPL: 1.5 (CALC) (ref 1–2.5)
ALP SERPL-CCNC: 91 U/L (ref 40–115)
ALT SERPL-CCNC: 16 U/L (ref 9–46)
AST SERPL-CCNC: 18 U/L (ref 10–35)
BASOPHILS # BLD AUTO: 47 CELLS/UL (ref 0–200)
BASOPHILS NFR BLD AUTO: 0.8 %
BILIRUB SERPL-MCNC: 0.5 MG/DL (ref 0.2–1.2)
BUN SERPL-MCNC: 47 MG/DL (ref 7–25)
BUN/CREAT SERPL: 33 (CALC) (ref 6–22)
CALCIUM SERPL-MCNC: 9.6 MG/DL (ref 8.6–10.3)
CHLORIDE SERPL-SCNC: 103 MMOL/L (ref 98–110)
CO2 SERPL-SCNC: 30 MMOL/L (ref 20–32)
CREAT SERPL-MCNC: 1.42 MG/DL (ref 0.7–1.18)
EOSINOPHIL # BLD AUTO: 148 CELLS/UL (ref 15–500)
EOSINOPHIL NFR BLD AUTO: 2.5 %
ERYTHROCYTE [DISTWIDTH] IN BLOOD BY AUTOMATED COUNT: 13 % (ref 11–15)
GLOBULIN SER CALC-MCNC: 2.6 G/DL (CALC) (ref 1.9–3.7)
GLUCOSE SERPL-MCNC: 135 MG/DL (ref 65–99)
HCT VFR BLD AUTO: 42.8 % (ref 38.5–50)
HGB BLD-MCNC: 14.1 G/DL (ref 13.2–17.1)
LYMPHOCYTES # BLD AUTO: 1086 CELLS/UL (ref 850–3900)
LYMPHOCYTES NFR BLD AUTO: 18.4 %
MCH RBC QN AUTO: 29.8 PG (ref 27–33)
MCHC RBC AUTO-ENTMCNC: 32.9 G/DL (ref 32–36)
MCV RBC AUTO: 90.5 FL (ref 80–100)
MONOCYTES # BLD AUTO: 478 CELLS/UL (ref 200–950)
MONOCYTES NFR BLD AUTO: 8.1 %
NEUTROPHILS # BLD AUTO: 4142 CELLS/UL (ref 1500–7800)
NEUTROPHILS NFR BLD AUTO: 70.2 %
PLATELET # BLD AUTO: 147 THOUSAND/UL (ref 140–400)
PMV BLD REES-ECKER: 9.7 FL (ref 7.5–12.5)
POTASSIUM SERPL-SCNC: 4.2 MMOL/L (ref 3.5–5.3)
PROT SERPL-MCNC: 6.5 G/DL (ref 6.1–8.1)
RBC # BLD AUTO: 4.73 MILLION/UL (ref 4.2–5.8)
SL AMB EGFR AFRICAN AMERICAN: 55 ML/MIN/1.73M2
SL AMB EGFR NON AFRICAN AMERICAN: 47 ML/MIN/1.73M2
SODIUM SERPL-SCNC: 142 MMOL/L (ref 135–146)
WBC # BLD AUTO: 5.9 THOUSAND/UL (ref 3.8–10.8)

## 2018-10-17 ENCOUNTER — DOCUMENTATION (OUTPATIENT)
Dept: ENDOCRINOLOGY | Facility: CLINIC | Age: 78
End: 2018-10-17

## 2018-10-18 ENCOUNTER — OFFICE VISIT (OUTPATIENT)
Dept: FAMILY MEDICINE CLINIC | Facility: CLINIC | Age: 78
End: 2018-10-18
Payer: MEDICARE

## 2018-10-18 VITALS
SYSTOLIC BLOOD PRESSURE: 120 MMHG | DIASTOLIC BLOOD PRESSURE: 74 MMHG | WEIGHT: 240 LBS | OXYGEN SATURATION: 97 % | BODY MASS INDEX: 37.59 KG/M2 | RESPIRATION RATE: 18 BRPM | TEMPERATURE: 98.2 F | HEART RATE: 64 BPM

## 2018-10-18 DIAGNOSIS — I35.0 AORTIC VALVE STENOSIS, ETIOLOGY OF CARDIAC VALVE DISEASE UNSPECIFIED: ICD-10-CM

## 2018-10-18 DIAGNOSIS — G47.33 OBSTRUCTIVE SLEEP APNEA SYNDROME: ICD-10-CM

## 2018-10-18 DIAGNOSIS — L21.9 SEBORRHEIC DERMATITIS: ICD-10-CM

## 2018-10-18 DIAGNOSIS — E11.39 TYPE 2 DIABETES MELLITUS WITH OTHER OPHTHALMIC COMPLICATION, WITH LONG-TERM CURRENT USE OF INSULIN (HCC): ICD-10-CM

## 2018-10-18 DIAGNOSIS — J96.11 CHRONIC RESPIRATORY FAILURE WITH HYPOXIA AND HYPERCAPNIA (HCC): ICD-10-CM

## 2018-10-18 DIAGNOSIS — E11.8 TYPE 2 DIABETES MELLITUS WITH COMPLICATION, UNSPECIFIED WHETHER LONG TERM INSULIN USE: ICD-10-CM

## 2018-10-18 DIAGNOSIS — J96.12 CHRONIC RESPIRATORY FAILURE WITH HYPOXIA AND HYPERCAPNIA (HCC): ICD-10-CM

## 2018-10-18 DIAGNOSIS — N18.2 STAGE 2 CHRONIC KIDNEY DISEASE: ICD-10-CM

## 2018-10-18 DIAGNOSIS — I50.30 (HFPEF) HEART FAILURE WITH PRESERVED EJECTION FRACTION (HCC): Primary | ICD-10-CM

## 2018-10-18 DIAGNOSIS — I10 HYPERTENSION, UNSPECIFIED TYPE: ICD-10-CM

## 2018-10-18 DIAGNOSIS — Z23 ENCOUNTER FOR IMMUNIZATION: ICD-10-CM

## 2018-10-18 DIAGNOSIS — E66.01 CLASS 3 SEVERE OBESITY DUE TO EXCESS CALORIES WITH SERIOUS COMORBIDITY AND BODY MASS INDEX (BMI) OF 40.0 TO 44.9 IN ADULT (HCC): ICD-10-CM

## 2018-10-18 DIAGNOSIS — Z79.4 TYPE 2 DIABETES MELLITUS WITH OTHER OPHTHALMIC COMPLICATION, WITH LONG-TERM CURRENT USE OF INSULIN (HCC): ICD-10-CM

## 2018-10-18 PROCEDURE — G0008 ADMIN INFLUENZA VIRUS VAC: HCPCS

## 2018-10-18 PROCEDURE — 90662 IIV NO PRSV INCREASED AG IM: CPT

## 2018-10-18 PROCEDURE — 99214 OFFICE O/P EST MOD 30 MIN: CPT | Performed by: INTERNAL MEDICINE

## 2018-10-19 NOTE — PROGRESS NOTES
Assessment/Plan:      Diet reviewed  Lifestyle modifications reviewed  Medications reviewed and ordered  Laboratory tests and studies reviewed and ordered  All patient's questions answered to patient satisfaction  Diagnoses and all orders for this visit:    (HFpEF) heart failure with preserved ejection fraction (HCC)  -     CBC and differential; Future  -     Comprehensive metabolic panel; Future    Aortic valve stenosis, etiology of cardiac valve disease unspecified    Type 2 diabetes mellitus with complication, unspecified whether long term insulin use (HCC)  -     CBC and differential; Future  -     Comprehensive metabolic panel; Future    Hypertension, unspecified type    Encounter for immunization  -     influenza vaccine, [id number], high-dose, PF 0 5 mL, for patients 65 yr+ (FLUZONE HIGH-DOSE)    Seborrheic dermatitis    Class 3 severe obesity due to excess calories with serious comorbidity and body mass index (BMI) of 40 0 to 44 9 in adult [facility])    Stage 2 chronic kidney disease  -     CBC and differential; Future  -     Comprehensive metabolic panel; Future    Type 2 diabetes mellitus with other ophthalmic complication, with long-term current use of insulin (HCC)  -     CBC and differential; Future  -     Comprehensive metabolic panel; Future    Chronic respiratory failure with hypoxia and hypercapnia (HCC)  -     CBC and differential; Future  -     Comprehensive metabolic panel; Future    Obstructive sleep apnea syndrome  -     CBC and differential; Future  -     Comprehensive metabolic panel; Future        Subjective:      Patient ID: Caden Ahmadi is a 69 y o  male  HPI  This 27-year-old male is here for the following    Edema this is stable +1 on the right +1 to 2 in the left  Weight is stable at 240 lb with adjustments of diuretics as instructed  Chronic renal impairment from pre renal azotemia and intrinsic renal disease is stable with a GFR 47      Obstructive sleep apnea the patient continues to wear his oxygen and remains alert      Insulin-dependent type 2 diabetes mellitus    Diastolic congestive heart failure and cor pulmonale stable    Current Outpatient Prescriptions:     aspirin (ASPIRIN LOW DOSE) 81 MG tablet, every 24 hours, Disp: , Rfl:     atorvastatin (LIPITOR) 10 mg tablet, Take 1 tablet by mouth daily, Disp: , Rfl:     calcium carbonate (CALCIUM 600) 600 MG tablet, Take 1 tablet by mouth daily, Disp: , Rfl:     ferrous sulfate 325 (65 Fe) mg tablet, Every 12 hours, Disp: , Rfl:     furosemide (LASIX) 40 mg tablet, Take 80 mg by mouth 2 (two) times a day  , Disp: , Rfl:     glucose blood (FREESTYLE LITE) test strip, Use 4/day, Disp: 400 each, Rfl: 3    HUMALOG KWIKPEN 100 units/mL injection pen, INJECT 16 UNITS BEFORE  BREAKFAST, 25 UNITS BEFORE  LUNCH, 45 UNITS BEFORE  SUPPER, AND 20 UNITS BEFORE BEDTIME, Disp: 105 mL, Rfl: 1    Insulin Glargine (TOUJEO SOLOSTAR) injection pen 300 units/mL, Inject 60 Units under the skin daily, Disp: 3 pen, Rfl: 0    levothyroxine (SYNTHROID) 88 mcg tablet, Take 1 tablet by mouth daily, Disp: , Rfl:     lisinopril (ZESTRIL) 20 mg tablet, Take 1 tablet by mouth daily, Disp: , Rfl:     metolazone (ZAROXOLYN) 2 5 mg tablet, take 1 tablet by oral route  every week, Disp: , Rfl:     metoprolol succinate (TOPROL-XL) 25 mg 24 hr tablet, , Disp: , Rfl:     Multiple Vitamins-Minerals (EYE VITAMINS) CAPS, Take 1 capsule by mouth daily, Disp: , Rfl:     Multiple Vitamins-Minerals (MULTIVITAMIN ADULT PO), Take 1 tablet by mouth daily, Disp: , Rfl:     pantoprazole (PROTONIX) 40 mg tablet, Take 1 tablet (40 mg total) by mouth daily, Disp: 30 tablet, Rfl: 3    tamsulosin (FLOMAX) 0 4 mg, TAKE 1 CAPSULE BY MOUTH  EVERY DAY 1/2 HOUR  FOLLOWING THE SAME MEAL  EACH DAY, Disp: 90 capsule, Rfl: 2    The following portions of the patient's history were reviewed and updated as appropriate: allergies, current medications, past family history, past medical history, past social history, past surgical history and problem list     Review of Systems   Constitutional: Negative for appetite change, fatigue, fever and unexpected weight change  HENT: Negative for rhinorrhea, sinus pain, sinus pressure, sneezing and sore throat  Eyes: Negative for visual disturbance  Respiratory: Positive for shortness of breath  Negative for cough, chest tightness and wheezing  Cardiovascular: Positive for leg swelling  Negative for chest pain and palpitations  Gastrointestinal: Negative for abdominal distention, abdominal pain, blood in stool, constipation, diarrhea, nausea and vomiting  Endocrine: Negative for polydipsia and polyuria  Genitourinary: Negative for decreased urine volume, difficulty urinating, dysuria, hematuria and urgency  Musculoskeletal: Negative for arthralgias, back pain, joint swelling and neck pain  Skin: Negative for rash  Allergic/Immunologic: Negative for environmental allergies  Neurological: Negative for tremors, weakness, light-headedness, numbness and headaches  Hematological: Does not bruise/bleed easily  Psychiatric/Behavioral: Negative for agitation, behavioral problems, confusion and dysphoric mood  The patient is not nervous/anxious            Family History   Problem Relation Age of Onset    Diabetes type II Maternal Grandfather     Diabetes type II Paternal Grandfather     Coronary artery disease Father     Kidney disease Sister     Ovarian cancer Maternal Grandmother        Past Medical History:   Diagnosis Date    Aortic stenosis     Benign hypertension     Chronic diastolic heart failure (HCC)     Colon polyps     Diabetes ([facility] )     Diverticulitis     GERD (gastroesophageal reflux disease)     Hypertension     Hypothyroidism     Obstructive sleep apnea syndrome     Prostatism     Sleep apnea     Stomach ulcer     Stroke ([facility] )     Thyroid disease     Vitamin D deficiency        Past Surgical History: Procedure Laterality Date    CATARACT EXTRACTION      REPLACEMENT TOTAL KNEE      SINUS SURGERY      with deviated septum       Social History     Social History    Marital status: /Civil Union     Spouse name: N/A    Number of children: N/A    Years of education: N/A     Occupational History    retired      Social History Main Topics    Smoking status: Former Smoker     Types: Cigarettes     Quit date: 1981    Smokeless tobacco: Never Used    Alcohol use No    Drug use: No    Sexual activity: No     Other Topics Concern    None     Social History Narrative    Pt states he has 1 cup of tea daily       Allergies   Allergen Reactions    No Known Allergies          Objective:      /74   Pulse 64   Temp 98 2 °F (36 8 °C) (Tympanic)   Resp 18   Wt 109 kg (240 lb)   SpO2 97%   BMI 37 59 kg/m²        Physical Exam   Cardiovascular:   Murmur heard  Musculoskeletal: He exhibits edema

## 2018-10-22 LAB
CREAT ?TM UR-SCNC: 74 UMOL/L
HBA1C MFR BLD HPLC: 7.2 %
MICROALBUMIN UR-MCNC: 0.2 MG/L (ref 0–20)
MICROALBUMIN/CREAT UR: 3 MG/G{CREAT}

## 2018-10-23 DIAGNOSIS — Z79.4 TYPE 2 DIABETES MELLITUS WITH OTHER OPHTHALMIC COMPLICATION, WITH LONG-TERM CURRENT USE OF INSULIN (HCC): ICD-10-CM

## 2018-10-23 DIAGNOSIS — K21.9 GASTROESOPHAGEAL REFLUX DISEASE WITHOUT ESOPHAGITIS: ICD-10-CM

## 2018-10-23 DIAGNOSIS — E11.39 TYPE 2 DIABETES MELLITUS WITH OTHER OPHTHALMIC COMPLICATION, WITH LONG-TERM CURRENT USE OF INSULIN (HCC): ICD-10-CM

## 2018-10-23 RX ORDER — PANTOPRAZOLE SODIUM 40 MG/1
40 TABLET, DELAYED RELEASE ORAL DAILY
Qty: 90 TABLET | Refills: 3 | Status: SHIPPED | OUTPATIENT
Start: 2018-10-23 | End: 2019-11-11 | Stop reason: SDUPTHER

## 2018-10-23 RX ORDER — FERROUS SULFATE 325(65) MG
325 TABLET ORAL 2 TIMES DAILY WITH MEALS
Qty: 180 TABLET | Refills: 3 | Status: SHIPPED | OUTPATIENT
Start: 2018-10-23

## 2018-10-23 NOTE — TELEPHONE ENCOUNTER
Patient called for a medication refill for Furosemide   80 mg tablets-take 80 mg 2 times daily #90, Pantoprazole 40 mg tablets-1 tablet daily #90 and he would like them called into [facility]

## 2018-10-29 DIAGNOSIS — E11.39 TYPE 2 DIABETES MELLITUS WITH OTHER OPHTHALMIC COMPLICATION, WITH LONG-TERM CURRENT USE OF INSULIN (HCC): ICD-10-CM

## 2018-10-29 DIAGNOSIS — Q77.7: Primary | ICD-10-CM

## 2018-10-29 DIAGNOSIS — Q87.89: Primary | ICD-10-CM

## 2018-10-29 DIAGNOSIS — Z79.4 TYPE 2 DIABETES MELLITUS WITH OTHER OPHTHALMIC COMPLICATION, WITH LONG-TERM CURRENT USE OF INSULIN (HCC): ICD-10-CM

## 2018-10-29 RX ORDER — INSULIN GLARGINE 300 U/ML
INJECTION, SOLUTION SUBCUTANEOUS
Qty: 27 ML | Refills: 1 | Status: SHIPPED | OUTPATIENT
Start: 2018-10-29 | End: 2019-01-22 | Stop reason: DRUGHIGH

## 2018-10-30 ENCOUNTER — OFFICE VISIT (OUTPATIENT)
Dept: ENDOCRINOLOGY | Facility: CLINIC | Age: 78
End: 2018-10-30
Payer: MEDICARE

## 2018-10-30 VITALS
BODY MASS INDEX: 36.14 KG/M2 | WEIGHT: 244 LBS | SYSTOLIC BLOOD PRESSURE: 118 MMHG | HEIGHT: 69 IN | DIASTOLIC BLOOD PRESSURE: 56 MMHG | HEART RATE: 60 BPM

## 2018-10-30 DIAGNOSIS — E55.9 VITAMIN D DEFICIENCY: ICD-10-CM

## 2018-10-30 DIAGNOSIS — E78.5 HYPERLIPIDEMIA, UNSPECIFIED HYPERLIPIDEMIA TYPE: ICD-10-CM

## 2018-10-30 DIAGNOSIS — I10 HYPERTENSION, UNSPECIFIED TYPE: ICD-10-CM

## 2018-10-30 DIAGNOSIS — Z79.4 TYPE 2 DIABETES MELLITUS WITH OTHER OPHTHALMIC COMPLICATION, WITH LONG-TERM CURRENT USE OF INSULIN (HCC): Primary | ICD-10-CM

## 2018-10-30 DIAGNOSIS — E11.39 TYPE 2 DIABETES MELLITUS WITH OTHER OPHTHALMIC COMPLICATION, WITH LONG-TERM CURRENT USE OF INSULIN (HCC): Primary | ICD-10-CM

## 2018-10-30 PROCEDURE — 99215 OFFICE O/P EST HI 40 MIN: CPT | Performed by: PHYSICIAN ASSISTANT

## 2018-10-30 RX ORDER — FUROSEMIDE 80 MG
TABLET ORAL
Qty: 180 TABLET | Refills: 3 | Status: SHIPPED | OUTPATIENT
Start: 2018-10-30 | End: 2019-11-11 | Stop reason: SDUPTHER

## 2018-10-30 NOTE — PROGRESS NOTES
Established Patient Progress Note      Chief Complaint   Patient presents with    Diabetes Type 2          History of Present Illness:   Travis Deshpande is a 69 y o  male with a history of type 2 diabetes with long term use of insulin  Reports complications of Retinopathy and CKD  Denies recent illness or hospitalizations  Denies recent severe hypoglycemic or severe hyperglycemic episodes  Denies any issues with his current regimen  home glucose monitoring: are performed regularly 4x per day  Between taking care of himself and his wife he is  Doing a total of 9 fingerstick per day and about 10 insulin injections per day  Home blood glucose readings:   Before breakfast: typically 110-140  Before lunch: usually 110-160s, sometimes higher  Before dinner: 120s-170s, sometimes a little higher  Bedtime: Over past week bedtimes frequently over 200, never less than 160  Doesn't snack between dinner and bedtime    Current regimen: Toujeo 60 units at bedtime, Humalog 16-16-42 before meals and 10 at bedtime if BG over 200  Last Eye Exam: UTD every few weeks with Dr Apollo Schroeder, needs injections  Last Foot Exam: UTD with podiatry q 9 weeks  Has hypertension: Taking metoprolol and lisinopril  Has hyperlipidemia: Taking atorvastatin    Thyroid disorders: hypothyroidism- takes levothyroxine       Patient Active Problem List   Diagnosis    Aortic stenosis, moderate    BPH (benign prostatic hyperplasia)    (HFpEF) heart failure with preserved ejection fraction (HCC)    Chronic respiratory failure (HCC)    GERD (gastroesophageal reflux disease)    Hematochezia    Hyperlipidemia    Hypertension    Hypochromic microcytic anemia    Hypothyroidism    Obesity    Obstructive sleep apnea syndrome    Thrombocytopenia (HCC)    Type 2 diabetes mellitus with ophthalmic complication, with long-term current use of insulin (HCC)    Venous insufficiency    Vitamin D deficiency    Stage 2 chronic kidney disease Past Medical History:   Diagnosis Date    Aortic stenosis     Benign hypertension     Chronic diastolic heart failure (HCC)     Colon polyps     Diabetes (HCC)     Diverticulitis     GERD (gastroesophageal reflux disease)     Hypertension     Hypothyroidism     Obstructive sleep apnea syndrome     Prostatism     Sleep apnea     Stomach ulcer     Stroke ([facility] [address] )     Thyroid disease     Vitamin D deficiency       Past Surgical History:   Procedure Laterality Date    CATARACT EXTRACTION      REPLACEMENT TOTAL KNEE      SINUS SURGERY      with deviated septum      Family History   Problem Relation Age of Onset    Diabetes type II Maternal Grandfather     Diabetes type II Paternal Grandfather     Coronary artery disease Father     Kidney disease Sister     Ovarian cancer Maternal Grandmother      Social History   Substance Use Topics    Smoking status: Former Smoker     Types: Cigarettes     Quit date: 1981    Smokeless tobacco: Never Used    Alcohol use No     Allergies   Allergen Reactions    No Known Allergies          Current Outpatient Prescriptions:     aspirin (ASPIRIN LOW DOSE) 81 MG tablet, every 24 hours, Disp: , Rfl:     atorvastatin (LIPITOR) 10 mg tablet, Take 1 tablet by mouth daily, Disp: , Rfl:     ferrous sulfate 325 (65 Fe) mg tablet, Take 1 tablet (325 mg total) by mouth 2 (two) times a day with meals, Disp: 180 tablet, Rfl: 3    furosemide (LASIX) 80 mg tablet, TAKE 1 TABLET BY MOUTH TWO  TIMES DAILY, Disp: 180 tablet, Rfl: 3    glucose blood (FREESTYLE LITE) test strip, Use 4/day, Disp: 400 each, Rfl: 3    insulin lispro (HUMALOG KWIKPEN) 100 units/mL injection pen, INJECT 16 UNITS BEFORE  BREAKFAST, 16 UNITS BEFORE  LUNCH, 45 UNITS BEFORE  SUPPER, AND 10 UNITS BEFORE BEDTIME, Disp: 105 mL, Rfl: 0    levothyroxine (SYNTHROID) 88 mcg tablet, Take 1 tablet by mouth daily, Disp: , Rfl:     lisinopril (ZESTRIL) 20 mg tablet, Take 1 tablet by mouth daily, Disp: , Rfl:   metolazone (ZAROXOLYN) 2 5 mg tablet, take 1 tablet by oral route  every week, Disp: , Rfl:     metoprolol succinate (TOPROL-XL) 25 mg 24 hr tablet, , Disp: , Rfl:     Multiple Vitamins-Minerals (EYE VITAMINS) CAPS, Take 1 capsule by mouth daily, Disp: , Rfl:     Multiple Vitamins-Minerals (MULTIVITAMIN ADULT PO), Take 1 tablet by mouth daily, Disp: , Rfl:     pantoprazole (PROTONIX) 40 mg tablet, Take 1 tablet (40 mg total) by mouth daily, Disp: 90 tablet, Rfl: 3    tamsulosin (FLOMAX) 0 4 mg, TAKE 1 CAPSULE BY MOUTH  EVERY DAY 1/2 HOUR  FOLLOWING THE SAME MEAL  EACH DAY, Disp: 90 capsule, Rfl: 2    TOUJEO SOLOSTAR 300 units/mL CONCETRATED U-300 injection pen, INJECT SUBCUTANEOUSLY 90  UNITS EVERY NIGHT AT  BEDTIME (Patient taking differently: INJECT SUBCUTANEOUSLY 60  UNITS EVERY NIGHT AT  BEDTIME), Disp: 27 mL, Rfl: 1    Insulin Pen Needle (B-D UF III MINI PEN NEEDLES) 31G X 5 MM MISC, by Does not apply route 5 (five) times a day for 90 days, Disp: 500 each, Rfl: 3    Review of Systems    Physical Exam:  Body mass index is 36 03 kg/m²  /56   Pulse 60   Ht 5' 9" (1 753 m)   Wt 111 kg (244 lb)   BMI 36 03 kg/m²    Wt Readings from Last 3 Encounters:   10/30/18 111 kg (244 lb)   10/18/18 109 kg (240 lb)   09/14/18 110 kg (242 lb)       Physical Exam   Constitutional: He is oriented to person, place, and time  He appears well-developed and well-nourished  No distress  HENT:   Head: Normocephalic and atraumatic  Mouth/Throat: Oropharynx is clear and moist    Eyes: Pupils are equal, round, and reactive to light  Conjunctivae and EOM are normal    Neck: Normal range of motion  Neck supple  No thyromegaly present  Cardiovascular: Normal rate and regular rhythm  Murmur heard  Pulmonary/Chest: Effort normal and breath sounds normal  No respiratory distress  He has no wheezes  He has no rales  Abdominal: Soft  Bowel sounds are normal  He exhibits no distension  There is no tenderness  Musculoskeletal: Normal range of motion  He exhibits no edema  Lymphadenopathy:     He has no cervical adenopathy  Neurological: He is alert and oriented to person, place, and time  Skin: Skin is warm and dry  Psychiatric: He has a normal mood and affect  Vitals reviewed  Diabetic Foot Exam  Not performed     Labs:     Lab Results   Component Value Date    HGBA1C 7 2 10/22/2018     No results found for: GLU  Lab Results   Component Value Date    HGBA1C 7 2 10/22/2018       Lab Results   Component Value Date    CREATININE 1 43 (H) 01/23/2017    CREATININE 1 14 02/26/2016    BUN 47 (H) 10/15/2018     01/23/2017    K 4 2 10/15/2018     10/15/2018    CO2 30 10/15/2018     No results found for: EGFR    Lab Results   Component Value Date    CHOL 104 (L) 05/10/2017    HDL 34 (L) 05/10/2017    TRIG 63 05/10/2017       Lab Results   Component Value Date    ALT 22 01/23/2017    AST 26 01/23/2017    ALKPHOS 91 10/15/2018    BILITOT 0 8 01/23/2017       Lab Results   Component Value Date    FLW0LXVUPLUZ 1 75 05/10/2017    HLC9NNKZTVKC 3 67 01/23/2017    QJQ3FCMUXTCT 2 74 02/26/2016     Lab Results   Component Value Date    FREET4 1 1 02/23/2018       Impression & Plan:    Problem List Items Addressed This Visit     Hyperlipidemia     Continue Statin  Check Lipid Panel  Relevant Orders    Lipid Panel with Direct LDL reflex    Hypertension     Well controlled on current medications         Type 2 diabetes mellitus with ophthalmic complication, with long-term current use of insulin ([facility]) - Primary     A1C higher at 7 2 but at goal for his age  Due to persistent hyperglycemia at bedtime will increase Dinner dose of Humalog to 45 units daily  Continue to monitor BG 4x per day and send log in two weeks  He is checking blood sugars at least 4x per day and taking insulin 4-5x per day  He has type 2 diabetes and would benefit from therapeutic CGM to help with frequent adjustments of insulin    At this time, we will work on getting DexCom G6 CGM for his wife and we feel it would be too overwhelming for both of them to start a new device at the same time  Once he is training and comfortable using dexcom for his wife will submit info for his to get his own dexcom  Relevant Medications    Insulin Pen Needle (B-D UF III MINI PEN NEEDLES) 31G X 5 MM MISC    insulin lispro (HUMALOG KWIKPEN) 100 units/mL injection pen    Other Relevant Orders    Hemoglobin A1C    Comprehensive metabolic panel    Lipid Panel with Direct LDL reflex    Vitamin D deficiency     Continue supplements               Orders Placed This Encounter   Procedures    Hemoglobin A1C     Standing Status:   Future     Standing Expiration Date:   10/30/2019    Comprehensive metabolic panel     This is a patient instruction: Patient fasting for 8 hours or longer recommended  Standing Status:   Future     Standing Expiration Date:   10/30/2019    Lipid Panel with Direct LDL reflex     This is a patient instruction: This test requires patient fasting for 10-12 hours or longer  Drinking of black coffee or black tea is acceptable  Standing Status:   Future     Standing Expiration Date:   10/30/2019       Patient Instructions   Increase Dinner Dose of Humalog to 45 units  Send log in two weeks  Discussed with the patient and all questioned fully answered  He will call me if any problems arise  Follow-up appointment in 4 months       Counseled patient on diagnostic results, prognosis, risk and benefit of treatment options, instruction for management, importance of treatment compliance, Risk  factor reduction and impressions      Charley Gaitan PA-C

## 2018-10-30 NOTE — LETTER
October 30, 2018     Jessica Gould, [address]     Patient: Dwain Alcantar   YOB: 1940   Date of Visit: 10/30/2018       Dear Dr Christel Ko:    Thank you for referring Eugenia Leyva to me for evaluation  Below are my notes for this consultation  If you have questions, please do not hesitate to call me  I look forward to following your patient along with you  Sincerely,        Sweta Laughlin PA-C        CC: No Recipients  Sweta Laughlin PA-C  10/30/2018  3:21 PM  Sign at close encounter      Established Patient Progress Note      Chief Complaint   Patient presents with    Diabetes Type 2          History of Present Illness:   Dwain Alcantar is a 69 y o  male with a history of type 2 diabetes with long term use of insulin  Reports complications of Retinopathy and CKD  Denies recent illness or hospitalizations  Denies recent severe hypoglycemic or severe hyperglycemic episodes  Denies any issues with his current regimen  home glucose monitoring: are performed regularly 4x per day  Between taking care of himself and his wife he is  Doing a total of 9 fingerstick per day and about 10 insulin injections per day  Home blood glucose readings:   Before breakfast: typically 110-140  Before lunch: usually 110-160s, sometimes higher  Before dinner: 120s-170s, sometimes a little higher  Bedtime: Over past week bedtimes frequently over 200, never less than 160  Doesn't snack between dinner and bedtime    Current regimen: Toujeo 60 units at bedtime, Humalog 16-16-42 before meals and 10 at bedtime if BG over 200  Last Eye Exam: UTD every few weeks with Dr Montrell Fowler, needs injections  Last Foot Exam: UTD with podiatry q 9 weeks  Has hypertension: Taking metoprolol and lisinopril  Has hyperlipidemia: Taking atorvastatin    Thyroid disorders: hypothyroidism- takes levothyroxine       Patient Active Problem List   Diagnosis    Aortic stenosis, moderate    BPH (benign prostatic hyperplasia)    (HFpEF) heart failure with preserved ejection fraction (HCC)    Chronic respiratory failure (HCC)    GERD (gastroesophageal reflux disease)    Hematochezia    Hyperlipidemia    Hypertension    Hypochromic microcytic anemia    Hypothyroidism    Obesity    Obstructive sleep apnea syndrome    Thrombocytopenia (HCC)    Type 2 diabetes mellitus with ophthalmic complication, with long-term current use of insulin (HCC)    Venous insufficiency    Vitamin D deficiency    Stage 2 chronic kidney disease      Past Medical History:   Diagnosis Date    Aortic stenosis     Benign hypertension     Chronic diastolic heart failure (HCC)     Colon polyps     Diabetes (HCC)     Diverticulitis     GERD (gastroesophageal reflux disease)     Hypertension     Hypothyroidism     Obstructive sleep apnea syndrome     Prostatism     Sleep apnea     Stomach ulcer     Stroke ([facility] [address]     Thyroid disease     Vitamin D deficiency       Past Surgical History:   Procedure Laterality Date    CATARACT EXTRACTION      REPLACEMENT TOTAL KNEE      SINUS SURGERY      with deviated septum      Family History   Problem Relation Age of Onset    Diabetes type II Maternal Grandfather     Diabetes type II Paternal Grandfather     Coronary artery disease Father     Kidney disease Sister     Ovarian cancer Maternal Grandmother      Social History   Substance Use Topics    Smoking status: Former Smoker     Types: Cigarettes     Quit date: 1981    Smokeless tobacco: Never Used    Alcohol use No     Allergies   Allergen Reactions    No Known Allergies          Current Outpatient Prescriptions:     aspirin (ASPIRIN LOW DOSE) 81 MG tablet, every 24 hours, Disp: , Rfl:     atorvastatin (LIPITOR) 10 mg tablet, Take 1 tablet by mouth daily, Disp: , Rfl:     ferrous sulfate 325 (65 Fe) mg tablet, Take 1 tablet (325 mg total) by mouth 2 (two) times a day with meals, Disp: 180 tablet, Rfl: 3    furosemide (LASIX) 80 mg tablet, TAKE 1 TABLET BY MOUTH TWO  TIMES DAILY, Disp: 180 tablet, Rfl: 3    glucose blood (FREESTYLE LITE) test strip, Use 4/day, Disp: 400 each, Rfl: 3    insulin lispro (HUMALOG KWIKPEN) 100 units/mL injection pen, INJECT 16 UNITS BEFORE  BREAKFAST, 16 UNITS BEFORE  LUNCH, 45 UNITS BEFORE  SUPPER, AND 10 UNITS BEFORE BEDTIME, Disp: 105 mL, Rfl: 0    levothyroxine (SYNTHROID) 88 mcg tablet, Take 1 tablet by mouth daily, Disp: , Rfl:     lisinopril (ZESTRIL) 20 mg tablet, Take 1 tablet by mouth daily, Disp: , Rfl:     metolazone (ZAROXOLYN) 2 5 mg tablet, take 1 tablet by oral route  every week, Disp: , Rfl:     metoprolol succinate (TOPROL-XL) 25 mg 24 hr tablet, , Disp: , Rfl:     Multiple Vitamins-Minerals (EYE VITAMINS) CAPS, Take 1 capsule by mouth daily, Disp: , Rfl:     Multiple Vitamins-Minerals (MULTIVITAMIN ADULT PO), Take 1 tablet by mouth daily, Disp: , Rfl:     pantoprazole (PROTONIX) 40 mg tablet, Take 1 tablet (40 mg total) by mouth daily, Disp: 90 tablet, Rfl: 3    tamsulosin (FLOMAX) 0 4 mg, TAKE 1 CAPSULE BY MOUTH  EVERY DAY 1/2 HOUR  FOLLOWING THE SAME MEAL  EACH DAY, Disp: 90 capsule, Rfl: 2    TOUJEO SOLOSTAR 300 units/mL CONCETRATED U-300 injection pen, INJECT SUBCUTANEOUSLY 90  UNITS EVERY NIGHT AT  BEDTIME (Patient taking differently: INJECT SUBCUTANEOUSLY 60  UNITS EVERY NIGHT AT  BEDTIME), Disp: 27 mL, Rfl: 1    Insulin Pen Needle (B-D UF III MINI PEN NEEDLES) 31G X 5 MM MISC, by Does not apply route 5 (five) times a day for 90 days, Disp: 500 each, Rfl: 3    Review of Systems    Physical Exam:  Body mass index is 36 03 kg/m²  /56   Pulse 60   Ht 5' 9" (1 753 m)   Wt 111 kg (244 lb)   BMI 36 03 kg/m²     Wt Readings from Last 3 Encounters:   10/30/18 111 kg (244 lb)   10/18/18 109 kg (240 lb)   09/14/18 110 kg (242 lb)       Physical Exam   Constitutional: He is oriented to person, place, and time   He appears well-developed and well-nourished  No distress  HENT:   Head: Normocephalic and atraumatic  Mouth/Throat: Oropharynx is clear and moist    Eyes: Pupils are equal, round, and reactive to light  Conjunctivae and EOM are normal    Neck: Normal range of motion  Neck supple  No thyromegaly present  Cardiovascular: Normal rate and regular rhythm  Murmur heard  Pulmonary/Chest: Effort normal and breath sounds normal  No respiratory distress  He has no wheezes  He has no rales  Abdominal: Soft  Bowel sounds are normal  He exhibits no distension  There is no tenderness  Musculoskeletal: Normal range of motion  He exhibits no edema  Lymphadenopathy:     He has no cervical adenopathy  Neurological: He is alert and oriented to person, place, and time  Skin: Skin is warm and dry  Psychiatric: He has a normal mood and affect  Vitals reviewed  Diabetic Foot Exam  Not performed     Labs:     Lab Results   Component Value Date    HGBA1C 7 2 10/22/2018     No results found for: GLU  Lab Results   Component Value Date    HGBA1C 7 2 10/22/2018       Lab Results   Component Value Date    CREATININE 1 43 (H) 01/23/2017    CREATININE 1 14 02/26/2016    BUN 47 (H) 10/15/2018     01/23/2017    K 4 2 10/15/2018     10/15/2018    CO2 30 10/15/2018     No results found for: EGFR    Lab Results   Component Value Date    CHOL 104 (L) 05/10/2017    HDL 34 (L) 05/10/2017    TRIG 63 05/10/2017       Lab Results   Component Value Date    ALT 22 01/23/2017    AST 26 01/23/2017    ALKPHOS 91 10/15/2018    BILITOT 0 8 01/23/2017       Lab Results   Component Value Date    RKB0DAMMTNKD 1 75 05/10/2017    IUA6WTSAQNXC 3 67 01/23/2017    JDL4DADXUSOO 2 74 02/26/2016     Lab Results   Component Value Date    FREET4 1 1 02/23/2018       Impression & Plan:    Problem List Items Addressed This Visit     Hyperlipidemia     Continue Statin  Check Lipid Panel            Relevant Orders    Lipid Panel with Direct LDL reflex Hypertension     Well controlled on current medications         Type 2 diabetes mellitus with ophthalmic complication, with long-term current use of insulin ([facility]) - Primary     A1C higher at 7 2 but at goal for his age  Due to persistent hyperglycemia at bedtime will increase Dinner dose of Humalog to 45 units daily  Continue to monitor BG 4x per day and send log in two weeks  He is checking blood sugars at least 4x per day and taking insulin 4-5x per day  He has type 2 diabetes and would benefit from therapeutic CGM to help with frequent adjustments of insulin  At this time, we will work on getting DexCom G6 CGM for his wife and we feel it would be too overwhelming for both of them to start a new device at the same time  Once he is training and comfortable using dexcom for his wife will submit info for his to get his own dexcom  Relevant Medications    Insulin Pen Needle (B-D UF III MINI PEN NEEDLES) 31G X 5 MM MISC    insulin lispro (HUMALOG KWIKPEN) 100 units/mL injection pen    Other Relevant Orders    Hemoglobin A1C    Comprehensive metabolic panel    Lipid Panel with Direct LDL reflex    Vitamin D deficiency     Continue supplements               Orders Placed This Encounter   Procedures    Hemoglobin A1C     Standing Status:   Future     Standing Expiration Date:   10/30/2019    Comprehensive metabolic panel     This is a patient instruction: Patient fasting for 8 hours or longer recommended  Standing Status:   Future     Standing Expiration Date:   10/30/2019    Lipid Panel with Direct LDL reflex     This is a patient instruction: This test requires patient fasting for 10-12 hours or longer  Drinking of black coffee or black tea is acceptable  Standing Status:   Future     Standing Expiration Date:   10/30/2019       Patient Instructions   Increase Dinner Dose of Humalog to 45 units  Send log in two weeks  Discussed with the patient and all questioned fully answered   He will call me if any problems arise  Follow-up appointment in 4 months       Counseled patient on diagnostic results, prognosis, risk and benefit of treatment options, instruction for management, importance of treatment compliance, Risk  factor reduction and impressions      Huma Bermeo PA-C

## 2018-10-30 NOTE — ASSESSMENT & PLAN NOTE
A1C higher at 7 2 but at goal for his age  Due to persistent hyperglycemia at bedtime will increase Dinner dose of Humalog to 45 units daily  Continue to monitor BG 4x per day and send log in two weeks  He is checking blood sugars at least 4x per day and taking insulin 4-5x per day  He has type 2 diabetes and would benefit from therapeutic CGM to help with frequent adjustments of insulin  At this time, we will work on getting DexCom G6 CGM for his wife and we feel it would be too overwhelming for both of them to start a new device at the same time  Once he is training and comfortable using dexcom for his wife will submit info for his to get his own dexcom

## 2018-11-21 LAB
ALBUMIN SERPL-MCNC: 3.9 G/DL (ref 3.6–5.1)
ALBUMIN/GLOB SERPL: 1.6 (CALC) (ref 1–2.5)
ALP SERPL-CCNC: 94 U/L (ref 40–115)
ALT SERPL-CCNC: 15 U/L (ref 9–46)
AST SERPL-CCNC: 18 U/L (ref 10–35)
BASOPHILS # BLD AUTO: 53 CELLS/UL (ref 0–200)
BASOPHILS NFR BLD AUTO: 0.9 %
BILIRUB SERPL-MCNC: 0.5 MG/DL (ref 0.2–1.2)
BUN SERPL-MCNC: 45 MG/DL (ref 7–25)
BUN/CREAT SERPL: 34 (CALC) (ref 6–22)
CALCIUM SERPL-MCNC: 9.9 MG/DL (ref 8.6–10.3)
CHLORIDE SERPL-SCNC: 104 MMOL/L (ref 98–110)
CO2 SERPL-SCNC: 36 MMOL/L (ref 20–32)
CREAT SERPL-MCNC: 1.31 MG/DL (ref 0.7–1.18)
EOSINOPHIL # BLD AUTO: 183 CELLS/UL (ref 15–500)
EOSINOPHIL NFR BLD AUTO: 3.1 %
ERYTHROCYTE [DISTWIDTH] IN BLOOD BY AUTOMATED COUNT: 13.4 % (ref 11–15)
GLOBULIN SER CALC-MCNC: 2.5 G/DL (CALC) (ref 1.9–3.7)
GLUCOSE SERPL-MCNC: 114 MG/DL (ref 65–99)
HCT VFR BLD AUTO: 40.8 % (ref 38.5–50)
HGB BLD-MCNC: 13.8 G/DL (ref 13.2–17.1)
LYMPHOCYTES # BLD AUTO: 1015 CELLS/UL (ref 850–3900)
LYMPHOCYTES NFR BLD AUTO: 17.2 %
MCH RBC QN AUTO: 30.3 PG (ref 27–33)
MCHC RBC AUTO-ENTMCNC: 33.8 G/DL (ref 32–36)
MCV RBC AUTO: 89.5 FL (ref 80–100)
MONOCYTES # BLD AUTO: 490 CELLS/UL (ref 200–950)
MONOCYTES NFR BLD AUTO: 8.3 %
NEUTROPHILS # BLD AUTO: 4160 CELLS/UL (ref 1500–7800)
NEUTROPHILS NFR BLD AUTO: 70.5 %
PLATELET # BLD AUTO: 159 THOUSAND/UL (ref 140–400)
PMV BLD REES-ECKER: 9.7 FL (ref 7.5–12.5)
POTASSIUM SERPL-SCNC: 4.9 MMOL/L (ref 3.5–5.3)
PROT SERPL-MCNC: 6.4 G/DL (ref 6.1–8.1)
RBC # BLD AUTO: 4.56 MILLION/UL (ref 4.2–5.8)
SL AMB EGFR AFRICAN AMERICAN: 60 ML/MIN/1.73M2
SL AMB EGFR NON AFRICAN AMERICAN: 52 ML/MIN/1.73M2
SODIUM SERPL-SCNC: 145 MMOL/L (ref 135–146)
WBC # BLD AUTO: 5.9 THOUSAND/UL (ref 3.8–10.8)

## 2018-11-23 ENCOUNTER — OFFICE VISIT (OUTPATIENT)
Dept: FAMILY MEDICINE CLINIC | Facility: CLINIC | Age: 78
End: 2018-11-23
Payer: MEDICARE

## 2018-11-23 VITALS
WEIGHT: 245 LBS | HEIGHT: 69 IN | BODY MASS INDEX: 36.29 KG/M2 | SYSTOLIC BLOOD PRESSURE: 126 MMHG | DIASTOLIC BLOOD PRESSURE: 66 MMHG | TEMPERATURE: 97.2 F | OXYGEN SATURATION: 95 % | HEART RATE: 65 BPM

## 2018-11-23 DIAGNOSIS — N18.30 STAGE 3 CHRONIC KIDNEY DISEASE (HCC): ICD-10-CM

## 2018-11-23 DIAGNOSIS — E11.8 TYPE 2 DIABETES MELLITUS WITH COMPLICATION, UNSPECIFIED WHETHER LONG TERM INSULIN USE: ICD-10-CM

## 2018-11-23 DIAGNOSIS — R16.0 LIVER MASS: Primary | ICD-10-CM

## 2018-11-23 DIAGNOSIS — I50.30 (HFPEF) HEART FAILURE WITH PRESERVED EJECTION FRACTION (HCC): ICD-10-CM

## 2018-11-23 DIAGNOSIS — N18.2 STAGE 2 CHRONIC KIDNEY DISEASE: ICD-10-CM

## 2018-11-23 DIAGNOSIS — J47.9 BRONCHIECTASIS WITHOUT COMPLICATION (HCC): ICD-10-CM

## 2018-11-23 DIAGNOSIS — R91.8 MULTIPLE LUNG NODULES ON CT: ICD-10-CM

## 2018-11-23 PROBLEM — J43.2 CENTRILOBULAR EMPHYSEMA (HCC): Status: ACTIVE | Noted: 2018-11-23

## 2018-11-23 PROCEDURE — 99214 OFFICE O/P EST MOD 30 MIN: CPT | Performed by: INTERNAL MEDICINE

## 2018-11-23 NOTE — PROGRESS NOTES
Assessment/Plan:  Patient's kidney function has improved a 2 GFR 52 with a BUN in the 40s and creatinine 1 4 although the patient has gained 4 lb over a short appeared of a day or 2 and has decreased his metolazone to 2 5 weekly since I last saw him along with the 80 mg twice a day of furosemide  He has +1 to 2 peripheral edema which extends up his legs and thighs and into his sacral area  It appears he should take his metolazone today and take 2 5 mg every Monday and every other week take 2 5 mg on Thursday or Friday and another 1 as needed  Pulmonary and ordered a CT of the lungs without contrast   On the upper abdomen diffuse a 6 5 cm area in the dome of the liver which is described as a hypodense liver mass was noted  Radiology requested an MRI of the liver or a CT with contrast   Considering the patient's underlying renal dysfunction an MRI of the liver would be safe for if given without contrast and was ordered with an appointment following the MRI to review the results  Evidence of traction bronchiectasis and scarring in the right lower lobe and mild emphysema was noted on the CT scan  This is unchanged from 2015  Diet reviewed  Lifestyle modifications reviewed  Medications reviewed and ordered  Laboratory tests and studies reviewed and ordered  All patient's questions answered to patient satisfaction  Diagnoses and all orders for this visit:    Liver mass  -     MRI abdomen wo contrast; Future    (HFpEF) heart failure with preserved ejection fraction (HCC)    Type 2 diabetes mellitus with complication, unspecified whether long term insulin use (HCC)    Stage 2 chronic kidney disease    Stage 3 chronic kidney disease ([facility] [address] )  -     MRI abdomen wo contrast; Future    Multiple lung nodules on CT    Bronchiectasis without complication (HCC)        Subjective:      Patient ID: Genoveva Hicks is a 69 y o  male      HPI  This 16-year-old male is taking 80 mg of furosemide and Zaroxolyn 2 5 mg weekly which is a decreased dose  He has gained 4 lb just in the last couple of days over the Thanksgiving holiday and he was told he can take an extra dose of Zaroxolyn 2 5 mg every other week just today at this visit  He notes no increase in his chronic breathlessness and is oxygen dependent  His renal function has improved and his GFR is now 52 but he appears to be a bit fluid overloaded and it is likely that he will be down to the 40s when his fluid status is corrected  A CT ordered by Pulmonary demonstrated 2 small pulmonary nodules between 0 6 and 1 5 cm which require follow-up in 3 months with a CT  This was done without contrast     The same CT of the chest had upper abdominal views which demonstrated a 6 5 cm hypodense lesion possible mass in the dome of the liver with an MR of the liver versus CT of the abdomen and pelvis with IV contrast further evaluation      Current Outpatient Prescriptions:     aspirin (ASPIRIN LOW DOSE) 81 MG tablet, every 24 hours, Disp: , Rfl:     atorvastatin (LIPITOR) 10 mg tablet, Take 1 tablet by mouth daily, Disp: , Rfl:     ferrous sulfate 325 (65 Fe) mg tablet, Take 1 tablet (325 mg total) by mouth 2 (two) times a day with meals, Disp: 180 tablet, Rfl: 3    furosemide (LASIX) 80 mg tablet, TAKE 1 TABLET BY MOUTH TWO  TIMES DAILY, Disp: 180 tablet, Rfl: 3    glucose blood (FREESTYLE LITE) test strip, Use 4/day, Disp: 400 each, Rfl: 3    insulin lispro (HUMALOG KWIKPEN) 100 units/mL injection pen, INJECT 16 UNITS BEFORE  BREAKFAST, 16 UNITS BEFORE  LUNCH, 45 UNITS BEFORE  SUPPER, AND 10 UNITS BEFORE BEDTIME, Disp: 105 mL, Rfl: 0    Insulin Pen Needle (B-D UF III MINI PEN NEEDLES) 31G X 5 MM MISC, by Does not apply route 5 (five) times a day for 90 days, Disp: 500 each, Rfl: 3    levothyroxine (SYNTHROID) 88 mcg tablet, Take 1 tablet by mouth daily, Disp: , Rfl:     lisinopril (ZESTRIL) 20 mg tablet, Take 1 tablet by mouth daily, Disp: , Rfl:     metolazone (ZAROXOLYN) 2 5 mg tablet, take 1 tablet by oral route  every week, Disp: , Rfl:     metoprolol succinate (TOPROL-XL) 25 mg 24 hr tablet, , Disp: , Rfl:     Multiple Vitamins-Minerals (EYE VITAMINS) CAPS, Take 1 capsule by mouth daily, Disp: , Rfl:     Multiple Vitamins-Minerals (MULTIVITAMIN ADULT PO), Take 1 tablet by mouth daily, Disp: , Rfl:     pantoprazole (PROTONIX) 40 mg tablet, Take 1 tablet (40 mg total) by mouth daily, Disp: 90 tablet, Rfl: 3    tamsulosin (FLOMAX) 0 4 mg, TAKE 1 CAPSULE BY MOUTH  EVERY DAY 1/2 HOUR  FOLLOWING THE SAME MEAL  EACH DAY, Disp: 90 capsule, Rfl: 2    TOUJEO SOLOSTAR 300 units/mL CONCETRATED U-300 injection pen, INJECT SUBCUTANEOUSLY 90  UNITS EVERY NIGHT AT  BEDTIME (Patient taking differently: INJECT SUBCUTANEOUSLY 60  UNITS EVERY NIGHT AT  BEDTIME), Disp: 27 mL, Rfl: 1    The following portions of the patient's history were reviewed and updated as appropriate: allergies, current medications, past family history, past medical history, past social history, past surgical history and problem list     Review of Systems      Family History   Problem Relation Age of Onset    Diabetes type II Maternal Grandfather     Diabetes type II Paternal Grandfather     Coronary artery disease Father     Kidney disease Sister     Ovarian cancer Maternal Grandmother        Past Medical History:   Diagnosis Date    Aortic stenosis     Benign hypertension     Chronic diastolic heart failure (HCC)     Colon polyps     Diabetes ([address] )     Diverticulitis     GERD (gastroesophageal reflux disease)     Hypertension     Hypothyroidism     Obstructive sleep apnea syndrome     Prostatism     Sleep apnea     Stomach ulcer     [facility]     Thyroid disease     Vitamin D deficiency        Past Surgical History:   Procedure Laterality Date    CATARACT EXTRACTION      REPLACEMENT TOTAL KNEE      SINUS SURGERY      with deviated septum       Social History     Social History    Marital status: /Civil Union     Spouse name: N/A    Number of children: N/A    Years of education: N/A     Occupational History    retired      Social History Main Topics    Smoking status: Former Smoker     Types: Cigarettes     Quit date: 1981    Smokeless tobacco: Never Used    Alcohol use No    Drug use: No    Sexual activity: No     Other Topics Concern    None     Social History Narrative    Pt states he has 1 cup of tea daily       Allergies   Allergen Reactions    No Known Allergies          Objective:      /66 (BP Location: Left arm, Patient Position: Sitting, Cuff Size: Large)   Pulse 65   Temp (!) 97 2 °F (36 2 °C)   Ht 5' 9" (1 753 m)   Wt 111 kg (245 lb)   SpO2 95%   BMI 36 18 kg/m²        Physical Exam   Constitutional: He is oriented to person, place, and time  He appears well-developed and well-nourished  No distress  HENT:   Head: Normocephalic and atraumatic  Nose: Nose normal    Mouth/Throat: Oropharynx is clear and moist  No oropharyngeal exudate  Eyes: Pupils are equal, round, and reactive to light  Conjunctivae and EOM are normal  No scleral icterus  Neck: Normal range of motion  Neck supple  No JVD present  No tracheal deviation present  No thyromegaly present  Cardiovascular: Normal rate, regular rhythm and normal heart sounds  Exam reveals no gallop and no friction rub  No murmur heard  Pulmonary/Chest: Effort normal  No respiratory distress  He has no wheezes  He has no rales  He exhibits no tenderness  Inspiratory crackles present at the right base   Abdominal: Soft  Bowel sounds are normal  He exhibits no distension and no mass  There is no tenderness  There is no rebound and no guarding  Musculoskeletal: Normal range of motion  He exhibits edema  He exhibits no deformity  Lymphadenopathy:     He has no cervical adenopathy  Neurological: He is alert and oriented to person, place, and time  No cranial nerve deficit   Coordination normal  Skin: Skin is warm and dry  No rash noted  Psychiatric: He has a normal mood and affect   His behavior is normal  Judgment and thought content normal

## 2018-12-04 ENCOUNTER — HOSPITAL ENCOUNTER (OUTPATIENT)
Dept: MRI IMAGING | Facility: HOSPITAL | Age: 78
Discharge: HOME | End: 2018-12-04

## 2018-12-04 DIAGNOSIS — N18.30 STAGE 3 CHRONIC KIDNEY DISEASE (HCC): ICD-10-CM

## 2018-12-04 DIAGNOSIS — R16.0 LIVER MASS: ICD-10-CM

## 2018-12-05 DIAGNOSIS — E03.8 OTHER SPECIFIED HYPOTHYROIDISM: Primary | ICD-10-CM

## 2018-12-05 DIAGNOSIS — E78.2 MIXED HYPERLIPIDEMIA: ICD-10-CM

## 2018-12-05 RX ORDER — LEVOTHYROXINE SODIUM 88 UG/1
TABLET ORAL
Qty: 90 TABLET | Refills: 3 | Status: SHIPPED | OUTPATIENT
Start: 2018-12-05 | End: 2019-02-21 | Stop reason: SDUPTHER

## 2018-12-05 RX ORDER — LISINOPRIL 20 MG/1
TABLET ORAL
Qty: 90 TABLET | Refills: 3 | Status: SHIPPED | OUTPATIENT
Start: 2018-12-05 | End: 2019-02-21 | Stop reason: SDUPTHER

## 2018-12-05 RX ORDER — ATORVASTATIN CALCIUM 40 MG/1
TABLET, FILM COATED ORAL
Qty: 90 TABLET | Refills: 3 | Status: SHIPPED | OUTPATIENT
Start: 2018-12-05 | End: 2019-02-21 | Stop reason: SDUPTHER

## 2018-12-05 RX ORDER — METOPROLOL SUCCINATE 25 MG/1
TABLET, EXTENDED RELEASE ORAL
Qty: 90 TABLET | Refills: 3 | Status: SHIPPED | OUTPATIENT
Start: 2018-12-05 | End: 2019-02-21 | Stop reason: SDUPTHER

## 2018-12-07 ENCOUNTER — TELEPHONE (OUTPATIENT)
Dept: ENDOCRINOLOGY | Facility: CLINIC | Age: 78
End: 2018-12-07

## 2018-12-10 ENCOUNTER — HOSPITAL ENCOUNTER (OUTPATIENT)
Dept: RADIOLOGY | Facility: HOSPITAL | Age: 78
Discharge: HOME | End: 2018-12-10
Payer: MEDICARE

## 2018-12-10 PROCEDURE — 74181 MRI ABDOMEN W/O CONTRAST: CPT

## 2018-12-12 ENCOUNTER — TELEPHONE (OUTPATIENT)
Dept: FAMILY MEDICINE CLINIC | Facility: CLINIC | Age: 78
End: 2018-12-12

## 2018-12-12 NOTE — TELEPHONE ENCOUNTER
Ellyn Mendez from Dr Rockwell Scooter office called and he would like to speak to Dr Selena Love in regards to this patient  The patient had MRI of his liver and also a PET SCAN   Sujata Reese stated that they wanted to order other tests for him but they would like to discuss a few things with Dr Selena Love first   Please call Ellyn Mendez at [phone]

## 2018-12-24 ENCOUNTER — TELEPHONE (OUTPATIENT)
Dept: ENDOCRINOLOGY | Facility: CLINIC | Age: 78
End: 2018-12-24

## 2018-12-27 ENCOUNTER — OFFICE VISIT (OUTPATIENT)
Dept: FAMILY MEDICINE CLINIC | Facility: CLINIC | Age: 78
End: 2018-12-27
Payer: MEDICARE

## 2018-12-27 VITALS
WEIGHT: 245.4 LBS | HEART RATE: 87 BPM | RESPIRATION RATE: 17 BRPM | OXYGEN SATURATION: 96 % | DIASTOLIC BLOOD PRESSURE: 64 MMHG | BODY MASS INDEX: 36.35 KG/M2 | SYSTOLIC BLOOD PRESSURE: 118 MMHG | TEMPERATURE: 97.5 F | HEIGHT: 69 IN

## 2018-12-27 DIAGNOSIS — E78.2 MIXED HYPERLIPIDEMIA: ICD-10-CM

## 2018-12-27 DIAGNOSIS — R91.8 MULTIPLE LUNG NODULES ON CT: ICD-10-CM

## 2018-12-27 DIAGNOSIS — R16.0 LIVER MASS: Primary | ICD-10-CM

## 2018-12-27 DIAGNOSIS — E11.8 TYPE 2 DIABETES MELLITUS WITH COMPLICATION, UNSPECIFIED WHETHER LONG TERM INSULIN USE: ICD-10-CM

## 2018-12-27 PROCEDURE — 99214 OFFICE O/P EST MOD 30 MIN: CPT | Performed by: INTERNAL MEDICINE

## 2018-12-27 NOTE — PROGRESS NOTES
Assessment/Plan:  The patient has a 6 cm heterogeneous liver mass at the dome of the liver which may contain central necrosis on CT scan MRI and is suspicious on PET scan  He he also has 2 right upper lobe lung nodules 1 is 1 6 cm and the other is 0 6 cm which are both suspicious  He is scheduled for liver biopsy tomorrow he will see skip his insulin dose in the in the morning and treatment of potential hypoglycemia was discussed  Diet reviewed  Lifestyle modifications reviewed  Medications reviewed and ordered  Laboratory tests and studies reviewed and ordered  All patient's questions answered to patient satisfaction  Diagnoses and all orders for this visit:    Liver mass    Type 2 diabetes mellitus with complication, unspecified whether long term insulin use (HCC)    Mixed hyperlipidemia    Multiple lung nodules on CT        Subjective:      Patient ID: Dwain Alcantar is a 69 y o  male  HPI  This 69-year-old male who has oxygen central and obstructive sleep apnea with cor pulmonale/diastolic congestive heart failure and severe obesity  Patient has Ace 6 3 cm mass in the dome of his liver which is requiring a liver biopsy which will be done tomorrow  Studies including CT scan MRI and PET scan were reviewed in detail with the patient  Implications of multiples scenarios were discussed in detail with patient  Patient review remains optimistic and is hoping that somehow he does not have metastatic cancer  He also has to lung nodules in the right upper long 1 is 1 6 cm in the other 0 6 cm with a 1 6 cm lesion being suspicious on PET scan  Patient has not suffered any weight loss has no abdominal or chest pain or other symptoms that her unusual for him in any way      Current Outpatient Prescriptions:     aspirin (ASPIRIN LOW DOSE) 81 MG tablet, every 24 hours, Disp: , Rfl:     atorvastatin (LIPITOR) 40 mg tablet, TAKE 1 TABLET BY MOUTH  EVERY DAY, Disp: 90 tablet, Rfl: 3    ferrous sulfate 325 (65 Fe) mg tablet, Take 1 tablet (325 mg total) by mouth 2 (two) times a day with meals, Disp: 180 tablet, Rfl: 3    furosemide (LASIX) 80 mg tablet, TAKE 1 TABLET BY MOUTH TWO  TIMES DAILY, Disp: 180 tablet, Rfl: 3    glucose blood (FREESTYLE LITE) test strip, Use 4/day, Disp: 400 each, Rfl: 3    insulin lispro (HUMALOG KWIKPEN) 100 units/mL injection pen, INJECT 16 UNITS BEFORE  BREAKFAST, 16 UNITS BEFORE  LUNCH, 45 UNITS BEFORE  SUPPER, AND 10 UNITS BEFORE BEDTIME, Disp: 105 mL, Rfl: 0    Insulin Pen Needle (B-D UF III MINI PEN NEEDLES) 31G X 5 MM MISC, by Does not apply route 5 (five) times a day for 90 days, Disp: 500 each, Rfl: 3    levothyroxine 88 mcg tablet, TAKE 1 TABLET BY MOUTH  EVERY DAY, Disp: 90 tablet, Rfl: 3    lisinopril (ZESTRIL) 20 mg tablet, TAKE 1 TABLET BY MOUTH  EVERY DAY, Disp: 90 tablet, Rfl: 3    metolazone (ZAROXOLYN) 2 5 mg tablet, take 1 tablet by oral route  every week, Disp: , Rfl:     metoprolol succinate (TOPROL-XL) 25 mg 24 hr tablet, TAKE 1 TABLET BY MOUTH  EVERY DAY, Disp: 90 tablet, Rfl: 3    Multiple Vitamins-Minerals (EYE VITAMINS) CAPS, Take 1 capsule by mouth daily, Disp: , Rfl:     Multiple Vitamins-Minerals (MULTIVITAMIN ADULT PO), Take 1 tablet by mouth daily, Disp: , Rfl:     pantoprazole (PROTONIX) 40 mg tablet, Take 1 tablet (40 mg total) by mouth daily, Disp: 90 tablet, Rfl: 3    tamsulosin (FLOMAX) 0 4 mg, TAKE 1 CAPSULE BY MOUTH  EVERY DAY 1/2 HOUR  FOLLOWING THE SAME MEAL  EACH DAY, Disp: 90 capsule, Rfl: 2    TOUJEO SOLOSTAR 300 units/mL CONCETRATED U-300 injection pen, INJECT SUBCUTANEOUSLY 90  UNITS EVERY NIGHT AT  BEDTIME (Patient taking differently: INJECT SUBCUTANEOUSLY 60  UNITS EVERY NIGHT AT  BEDTIME), Disp: 27 mL, Rfl: 1    The following portions of the patient's history were reviewed and updated as appropriate: allergies, current medications, past family history, past medical history, past social history, past surgical history and problem list     Review of Systems   Constitutional: Negative for appetite change, fatigue, fever and unexpected weight change  HENT: Negative for rhinorrhea, sinus pain, sinus pressure, sneezing and sore throat  Eyes: Negative for visual disturbance  Respiratory: Negative for cough, chest tightness, shortness of breath and wheezing  Cardiovascular: Negative for chest pain, palpitations and leg swelling  Gastrointestinal: Negative for abdominal distention, abdominal pain, blood in stool, constipation, diarrhea, nausea and vomiting  Endocrine: Negative for polydipsia and polyuria  Genitourinary: Negative for decreased urine volume, difficulty urinating, dysuria, hematuria and urgency  Musculoskeletal: Negative for arthralgias, back pain, joint swelling and neck pain  Skin: Negative for rash  Allergic/Immunologic: Negative for environmental allergies  Neurological: Negative for tremors, weakness, light-headedness, numbness and headaches  Hematological: Does not bruise/bleed easily  Psychiatric/Behavioral: Negative for agitation, behavioral problems, confusion and dysphoric mood  The patient is not nervous/anxious            Family History   Problem Relation Age of Onset    Diabetes type II Maternal Grandfather     Diabetes type II Paternal Grandfather     Coronary artery disease Father     Kidney disease Sister     Ovarian cancer Maternal Grandmother        Past Medical History:   Diagnosis Date    Aortic stenosis     Benign hypertension     Chronic diastolic heart failure (HCC)     Colon polyps     Diabetes (HCC)     Diverticulitis     GERD (gastroesophageal reflux disease)     Hypertension     Hypothyroidism     Obstructive sleep apnea syndrome     Prostatism     Sleep apnea     Stomach ulcer     [facility]     Thyroid disease     Vitamin D deficiency        Past Surgical History:   Procedure Laterality Date    CATARACT EXTRACTION      REPLACEMENT TOTAL KNEE      SINUS SURGERY      with deviated septum       Social History     Social History    Marital status: /Civil Union     Spouse name: N/A    Number of children: N/A    Years of education: N/A     Occupational History    retired      Social History Main Topics    Smoking status: Former Smoker     Types: Cigarettes     Quit date: 1981    Smokeless tobacco: Never Used    Alcohol use No    Drug use: No    Sexual activity: No     Other Topics Concern    None     Social History Narrative    Pt states he has 1 cup of tea daily       Allergies   Allergen Reactions    No Known Allergies          Objective:      /64   Pulse 87   Temp 97 5 °F (36 4 °C) (Tympanic)   Resp 17   Ht 5' 9" (1 753 m)   Wt 111 kg (245 lb 6 4 oz)   SpO2 96%   BMI 36 24 kg/m²        Physical Exam   Constitutional: He is oriented to person, place, and time  He appears well-developed and well-nourished  No distress  HENT:   Head: Normocephalic and atraumatic  Nose: Nose normal    Mouth/Throat: Oropharynx is clear and moist  No oropharyngeal exudate  Eyes: Pupils are equal, round, and reactive to light  Conjunctivae and EOM are normal  No scleral icterus  Neck: Normal range of motion  Neck supple  No JVD present  No tracheal deviation present  No thyromegaly present  Cardiovascular: Normal rate, regular rhythm and normal heart sounds  Exam reveals no gallop and no friction rub  No murmur heard  Pulmonary/Chest: Effort normal  No respiratory distress  He has no wheezes  He has no rales  He exhibits no tenderness  By basilar crackles   Abdominal: Soft  Bowel sounds are normal  He exhibits no distension and no mass  There is no tenderness  There is no rebound and no guarding  Musculoskeletal: Normal range of motion  He exhibits no edema or deformity  Lymphadenopathy:     He has no cervical adenopathy  Neurological: He is alert and oriented to person, place, and time  No cranial nerve deficit   Coordination normal  Skin: Skin is warm and dry  No rash noted  Psychiatric: He has a normal mood and affect   His behavior is normal  Judgment and thought content normal

## 2019-01-22 ENCOUNTER — TELEPHONE (OUTPATIENT)
Dept: ENDOCRINOLOGY | Facility: CLINIC | Age: 79
End: 2019-01-22

## 2019-01-22 DIAGNOSIS — E11.39 TYPE 2 DIABETES MELLITUS WITH OTHER OPHTHALMIC COMPLICATION, WITH LONG-TERM CURRENT USE OF INSULIN (HCC): ICD-10-CM

## 2019-01-22 DIAGNOSIS — Z79.4 TYPE 2 DIABETES MELLITUS WITH OTHER OPHTHALMIC COMPLICATION, WITH LONG-TERM CURRENT USE OF INSULIN (HCC): ICD-10-CM

## 2019-01-22 RX ORDER — INSULIN GLARGINE 300 U/ML
INJECTION, SOLUTION SUBCUTANEOUS
Qty: 27 ML | Refills: 0
Start: 2019-01-22 | End: 2019-03-08 | Stop reason: SDUPTHER

## 2019-01-22 NOTE — TELEPHONE ENCOUNTER
Pt sent in BS readings, mornings mostly high  Increase Toujeo to 64 units  If snacking cut back on night time snacks  Called pt and informed patient states that he is not snacking  Patient verbally understands the dose increase

## 2019-02-18 DIAGNOSIS — E11.39 TYPE 2 DIABETES MELLITUS WITH OTHER OPHTHALMIC COMPLICATION, WITH LONG-TERM CURRENT USE OF INSULIN (HCC): ICD-10-CM

## 2019-02-18 DIAGNOSIS — Z79.4 TYPE 2 DIABETES MELLITUS WITH OTHER OPHTHALMIC COMPLICATION, WITH LONG-TERM CURRENT USE OF INSULIN (HCC): ICD-10-CM

## 2019-02-21 DIAGNOSIS — E78.2 MIXED HYPERLIPIDEMIA: ICD-10-CM

## 2019-02-21 DIAGNOSIS — E03.8 OTHER SPECIFIED HYPOTHYROIDISM: ICD-10-CM

## 2019-02-21 RX ORDER — LISINOPRIL 20 MG/1
20 TABLET ORAL DAILY
Qty: 90 TABLET | Refills: 3 | Status: SHIPPED | OUTPATIENT
Start: 2019-02-21 | End: 2019-11-11 | Stop reason: SDUPTHER

## 2019-02-21 RX ORDER — METOPROLOL SUCCINATE 25 MG/1
25 TABLET, EXTENDED RELEASE ORAL DAILY
Qty: 90 TABLET | Refills: 3 | Status: SHIPPED | OUTPATIENT
Start: 2019-02-21 | End: 2019-11-11 | Stop reason: SDUPTHER

## 2019-02-21 RX ORDER — LEVOTHYROXINE SODIUM 88 UG/1
88 TABLET ORAL DAILY
Qty: 90 TABLET | Refills: 3 | Status: SHIPPED | OUTPATIENT
Start: 2019-02-21 | End: 2019-11-11 | Stop reason: SDUPTHER

## 2019-02-21 RX ORDER — ATORVASTATIN CALCIUM 40 MG/1
40 TABLET, FILM COATED ORAL DAILY
Qty: 90 TABLET | Refills: 3 | Status: SHIPPED | OUTPATIENT
Start: 2019-02-21 | End: 2019-11-11 | Stop reason: SDUPTHER

## 2019-02-22 DIAGNOSIS — Z79.4 TYPE 2 DIABETES MELLITUS WITH OTHER OPHTHALMIC COMPLICATION, WITH LONG-TERM CURRENT USE OF INSULIN (HCC): ICD-10-CM

## 2019-02-22 DIAGNOSIS — E11.39 TYPE 2 DIABETES MELLITUS WITH OTHER OPHTHALMIC COMPLICATION, WITH LONG-TERM CURRENT USE OF INSULIN (HCC): ICD-10-CM

## 2019-02-25 ENCOUNTER — TELEPHONE (OUTPATIENT)
Dept: ENDOCRINOLOGY | Facility: CLINIC | Age: 79
End: 2019-02-25

## 2019-02-25 NOTE — TELEPHONE ENCOUNTER
Pt sent in BS readings, reduce lunchtime dose of Humalog to 12 units   Called pt and lm with this information

## 2019-03-01 ENCOUNTER — OFFICE VISIT (OUTPATIENT)
Dept: FAMILY MEDICINE CLINIC | Facility: CLINIC | Age: 79
End: 2019-03-01
Payer: MEDICARE

## 2019-03-01 VITALS
RESPIRATION RATE: 18 BRPM | SYSTOLIC BLOOD PRESSURE: 140 MMHG | WEIGHT: 244 LBS | HEIGHT: 69 IN | TEMPERATURE: 98.9 F | DIASTOLIC BLOOD PRESSURE: 70 MMHG | OXYGEN SATURATION: 95 % | HEART RATE: 62 BPM | BODY MASS INDEX: 36.14 KG/M2

## 2019-03-01 DIAGNOSIS — J96.12 CHRONIC RESPIRATORY FAILURE WITH HYPOXIA AND HYPERCAPNIA (HCC): ICD-10-CM

## 2019-03-01 DIAGNOSIS — E78.2 MIXED HYPERLIPIDEMIA: ICD-10-CM

## 2019-03-01 DIAGNOSIS — C22.1 CHOLANGIOCARCINOMA (HCC): Primary | ICD-10-CM

## 2019-03-01 DIAGNOSIS — D69.6 THROMBOCYTOPENIA (HCC): ICD-10-CM

## 2019-03-01 DIAGNOSIS — J96.11 CHRONIC RESPIRATORY FAILURE WITH HYPOXIA AND HYPERCAPNIA (HCC): ICD-10-CM

## 2019-03-01 DIAGNOSIS — N18.30 STAGE 3 CHRONIC KIDNEY DISEASE (HCC): ICD-10-CM

## 2019-03-01 DIAGNOSIS — J43.2 CENTRILOBULAR EMPHYSEMA (HCC): ICD-10-CM

## 2019-03-01 DIAGNOSIS — Z79.4 TYPE 2 DIABETES MELLITUS WITH OTHER OPHTHALMIC COMPLICATION, WITH LONG-TERM CURRENT USE OF INSULIN (HCC): ICD-10-CM

## 2019-03-01 DIAGNOSIS — E66.01 SEVERE OBESITY (BMI 35.0-39.9) WITH COMORBIDITY (HCC): ICD-10-CM

## 2019-03-01 DIAGNOSIS — E11.39 TYPE 2 DIABETES MELLITUS WITH OTHER OPHTHALMIC COMPLICATION, WITH LONG-TERM CURRENT USE OF INSULIN (HCC): ICD-10-CM

## 2019-03-01 PROCEDURE — 99214 OFFICE O/P EST MOD 30 MIN: CPT | Performed by: INTERNAL MEDICINE

## 2019-03-01 PROCEDURE — G0438 PPPS, INITIAL VISIT: HCPCS | Performed by: INTERNAL MEDICINE

## 2019-03-01 NOTE — PROGRESS NOTES
Assessment and Plan:  Problem List Items Addressed This Visit        Endocrine    Type 2 diabetes mellitus with ophthalmic complication, with long-term current use of insulin ([address] ) - Primary        Health Maintenance Due   Topic Date Due    BMI: Followup Plan  02[birth year]04/1959    HEPATITIS B VACCINES (1 of 3 - Risk 3-dose series) 02/04/1960    Pneumococcal PPSV23/PCV13 65+ Years / High and Highest Risk (2 of 2 - PPSV23) 04/05/2017         HPI:  Patient Active Problem List   Diagnosis    Aortic stenosis, moderate    BPH (benign prostatic hyperplasia)    (HFpEF) heart failure with preserved ejection fraction (HCC)    Chronic respiratory failure (HCC)    GERD (gastroesophageal reflux disease)    Hematochezia    Hyperlipidemia    Hypertension    Hypochromic microcytic anemia    Hypothyroidism    Obesity    Obstructive sleep apnea syndrome    Thrombocytopenia (HCC)    Type 2 diabetes mellitus with ophthalmic complication, with long-term current use of insulin (HCC)    Venous insufficiency    Vitamin D deficiency    Stage 2 chronic kidney disease    Bronchiectasis without complication ([address] )    Centrilobular emphysema (HCC)    Multiple lung nodules on CT    Liver mass     Past Medical History:   Diagnosis Date    Aortic stenosis     Benign hypertension     Chronic diastolic heart failure (HCC)     Colon polyps     Diabetes ([address] )     Diverticulitis     GERD (gastroesophageal reflux disease)     Hypertension     Hypothyroidism     Obstructive sleep apnea syndrome     Prostatism     Sleep apnea     Stomach ulcer     Stroke ([address] )     Thyroid disease     Vitamin D deficiency      Past Surgical History:   Procedure Laterality Date    CATARACT EXTRACTION      REPLACEMENT TOTAL KNEE      SINUS SURGERY      with deviated septum     Family History   Problem Relation Age of Onset    Diabetes type II Maternal Grandfather     Diabetes type II Paternal Grandfather     Coronary artery disease Father     Kidney disease Sister     Ovarian cancer Maternal Grandmother      Social History     Tobacco Use   Smoking Status Former Smoker    Types: Cigarettes    Last attempt to quit:     Years since quittin 1   Smokeless Tobacco Never Used     Social History     Substance and Sexual Activity   Alcohol Use No      Social History     Substance and Sexual Activity   Drug Use No         Current Outpatient Medications   Medication Sig Dispense Refill    aspirin (ASPIRIN LOW DOSE) 81 MG tablet every 24 hours      atorvastatin (LIPITOR) 40 mg tablet Take 1 tablet (40 mg total) by mouth daily 90 tablet 3    ferrous sulfate 325 (65 Fe) mg tablet Take 1 tablet (325 mg total) by mouth 2 (two) times a day with meals 180 tablet 3    furosemide (LASIX) 80 mg tablet TAKE 1 TABLET BY MOUTH TWO  TIMES DAILY 180 tablet 3    glucose blood (FREESTYLE LITE) test strip Use 4/day 400 each 3    insulin lispro (HUMALOG KWIKPEN) 100 units/mL injection pen INJECT SUBCUTANEOUSLY 16  UNITS BEFORE BREAKFAST, 25  UNITS BEFORE LUNCH, 45  UNITS BEFORE SUPPER, AND 20 UNITS BEFORE AT BEDTIME 32 pen 0    Insulin Pen Needle (B-D UF III MINI PEN NEEDLES) 31G X 5 MM MISC by Does not apply route 5 (five) times a day for 90 days 500 each 3    levothyroxine 88 mcg tablet Take 1 tablet (88 mcg total) by mouth daily 90 tablet 3    lisinopril (ZESTRIL) 20 mg tablet Take 1 tablet (20 mg total) by mouth daily 90 tablet 3    metolazone (ZAROXOLYN) 2 5 mg tablet take 1 tablet by oral route  every week      metoprolol succinate (TOPROL-XL) 25 mg 24 hr tablet Take 1 tablet (25 mg total) by mouth daily 90 tablet 3    Multiple Vitamins-Minerals (EYE VITAMINS) CAPS Take 1 capsule by mouth daily      Multiple Vitamins-Minerals (MULTIVITAMIN ADULT PO) Take 1 tablet by mouth daily      pantoprazole (PROTONIX) 40 mg tablet Take 1 tablet (40 mg total) by mouth daily 90 tablet 3    tamsulosin (FLOMAX) 0 4 mg TAKE 1 CAPSULE BY MOUTH  EVERY DAY 1/2 HOUR  FOLLOWING THE SAME MEAL  EACH DAY 90 capsule 2    TOUJEO SOLOSTAR 300 units/mL CONCETRATED U-300 injection pen Inject 64 units daily as directed 27 mL 0     No current facility-administered medications for this visit  Allergies   Allergen Reactions    No Known Allergies      Immunization History   Administered Date(s) Administered    INFLUENZA 01/16/2017, 10/24/2017    Influenza Split High Dose Preservative Free IM 01/16/2017, 10/24/2017    Influenza, high dose seasonal 0 5 mL 10/18/2018    Pneumococcal Conjugate 13-Valent 02/08/2017       Patient Care Team:  Jessica Greenwood MD as PCP - General  Jana Keita MD as PCP - Endocrinology (Endocrinology)  MD Pamela Moran PA-C    Medicare Screening Tests and Risk Assessments:      Health Risk Assessment:  Patient rates overall health as good  Patient feels that their physical health rating is Same  Eyesight was rated as Same  Hearing was rated as Same  Patient feels that their emotional and mental health rating is Same  Pain experienced by patient in the last 7 days has been Some  Patient's pain rating has been 2/10  Patient states that he has experienced no weight loss or gain in last 6 months  Emotional/Mental Health:  Patient has been feeling nervous/anxious  PHQ-9 Depression Screening:    Frequency of the following problems over the past two weeks:      1  Little interest or pleasure in doing things: 0 - not at all      2  Feeling down, depressed, or hopeless: 0 - not at all  PHQ-2 Score: 0          Broken Bones/Falls: Fall Risk Assessment:    In the past year, patient has experienced: No history of falling in past year          Bladder/Bowel:  Patient has not leaked urine accidently in the last six months  Patient reports no loss of bowel control  Immunizations:  Patient has had a flu vaccination within the last year  Patient has received a pneumonia shot  Patient has not received a shingles shot        Home Safety:  Patient does not have trouble with stairs inside or outside of their home  Patient currently reports that there are no safety hazards present in home, working smoke alarms, working carbon monoxide detectors  Preventative Screenings:   no prostate cancer screen performed, no colon cancer screen completed, cholesterol screen completed, glaucoma eye exam completed,     Nutrition:  Current diet: Regular with servings of the following:    Medications:  Patient is not currently taking any over-the-counter supplements  Patient is able to manage medications  Lifestyle Choices:  Patient reports no tobacco use  Patient has not smoked or used tobacco in the past   Patient reports no alcohol use  Patient drives a vehicle  Patient wears seat belt  Activities of Daily Living:  Can get out of bed by his or her self, able to dress self, able to make own meals, able to do own shopping, able to bathe self, can do own laundry/housekeeping, can manage own money, pay bills and track expenses    Previous Hospitalizations:  No hospitalization or ED visit in past 12 months        Advanced Directives:  Patient has decided on a power of   Patient has spoken to designated power of   Patient has completed advanced directive          Preventative Screening/Counseling:      Cardiovascular:      General: Risks and Benefits Discussed and Screening Current      Counseling: Healthy Diet, Healthy Weight, Improve Cholesterol and Improve Blood Pressure          Diabetes:      General: Risks and Benefits Discussed and Screening Current      Counseling: Healthy Diet and Healthy Weight          Colorectal Cancer:      General: Risks and Benefits Discussed and Patient Declines          Prostate Cancer:      General: Risks and Benefits Discussed and Patient Declines          Osteoporosis:      General: Risks and Benefits Discussed and Patient Declines      Counseling: Calcium and Vitamin D Intake          AAA: General: Risks and Benefits Discussed and Screening Current          Glaucoma:      General: Risks and Benefits Discussed and Screening Current          HIV:      General: Risks and Benefits Discussed and Patient Declines          Hepatitis C:      General: Risks and Benefits Discussed and Patient Declines        Advanced Directives:   Patient has living will for healthcare, has durable POA for healthcare, patient has an advanced directive  End of life assessment reviewed with patient  Provider agrees with end of life decisions   Immunizations:      Influenza: Risks & Benefits Discussed and Influenza UTD This Year      Pneumococcal: Risks & Benefits Discussed and Lifetime Vaccine Completed      Shingrix: Risks & Benefits Discussed and Patient Declines      Hepatitis B (Low risk patients): Prevention Counseling      Hepatitis B (Medium to high risk patients): Risks & Benefits Discussed and Patient Declines      Zostavax: Risks & Benefits Discussed and Patient Declines      TD: Risks & Benefits Discussed and Td Vaccine UTD      TDAP: Patient Declines and Risks & Benefits Discussed            No exam data present    Physical Exam:  Review of Systems   Gastrointestinal: Negative for bowel incontinence  Psychiatric/Behavioral: The patient is not nervous/anxious  There were no vitals filed for this visit  There is no height or weight on file to calculate BMI      Physical Exam

## 2019-03-01 NOTE — PROGRESS NOTES
Assessment/Plan:  The patient now has known cholangiocarcinoma that is the cause of his large right hepatic mass with central necrosis he has had 2 rounds of chemotherapy and is now in the future to get radiation therapy and immunotherapy  We had a discussion about potential side effects and a discussion about how well he was doing currently and how good his functional ability is despite his complex medical situation  The patient's diabetes remains under reasonably good control his renal function at this point is excellent he has +1 edema bilaterally and which is well controlled  He remains oxygen dependent and severely obese with 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  Diet reviewed  Lifestyle modifications reviewed  Medications reviewed and ordered  Laboratory tests and studies reviewed and ordered  All patient's questions answered to patient satisfaction  Diagnoses and all orders for this visit:    Cholangiocarcinoma ([facility] [address] )    Type 2 diabetes mellitus with other ophthalmic complication, with long-term current use of insulin (HCC)    Stage 3 chronic kidney disease ([facility] [address] )    Mixed hyperlipidemia    Centrilobular emphysema (HCC)    Chronic respiratory failure with hypoxia and hypercapnia (HCC)    Thrombocytopenia (HCC)    Severe obesity (BMI 35 0-39  9) with comorbidity ([facility] [address] )    Other orders  -     Cancel: POCT hemoglobin A1c        Subjective:      Patient ID: Jose Ramírez is a 67 y o  male  HPI  This 81-year-old male was started on chemotherapy at the end of 2018 and had 2 rounds as treatment for cholangiocarcinoma  He is now changing to radiotherapy and immunotherapy in combination  His functional level remains excellent he has had no side effects from his treatment so far  His other medical issues have remained stable      Type 2 diabetes mellitus with long-term use of insulin    Stage 3 chronic kidney disease is now stage II    Mixed hyperlipidemia    Chronic respiratory failure with hypoxia and hypercapnia secondary to mixed obstructive and central sleep apnea on BiPAP at home and on oxygen during the day he also has centrilobular emphysema      Cor pulmonale and diastolic heart failure are stable    Current Outpatient Medications:     aspirin (ASPIRIN LOW DOSE) 81 MG tablet, every 24 hours, Disp: , Rfl:     atorvastatin (LIPITOR) 40 mg tablet, Take 1 tablet (40 mg total) by mouth daily, Disp: 90 tablet, Rfl: 3    ferrous sulfate 325 (65 Fe) mg tablet, Take 1 tablet (325 mg total) by mouth 2 (two) times a day with meals, Disp: 180 tablet, Rfl: 3    furosemide (LASIX) 80 mg tablet, TAKE 1 TABLET BY MOUTH TWO  TIMES DAILY, Disp: 180 tablet, Rfl: 3    glucose blood (FREESTYLE LITE) test strip, Use 4/day, Disp: 400 each, Rfl: 3    insulin lispro (HUMALOG KWIKPEN) 100 units/mL injection pen, INJECT SUBCUTANEOUSLY 16  UNITS BEFORE BREAKFAST, 25  UNITS BEFORE LUNCH, 45  UNITS BEFORE SUPPER, AND 20 UNITS BEFORE AT BEDTIME, Disp: 32 pen, Rfl: 0    levothyroxine 88 mcg tablet, Take 1 tablet (88 mcg total) by mouth daily, Disp: 90 tablet, Rfl: 3    lisinopril (ZESTRIL) 20 mg tablet, Take 1 tablet (20 mg total) by mouth daily, Disp: 90 tablet, Rfl: 3    metolazone (ZAROXOLYN) 2 5 mg tablet, take 1 tablet by oral route  every week, Disp: , Rfl:     metoprolol succinate (TOPROL-XL) 25 mg 24 hr tablet, Take 1 tablet (25 mg total) by mouth daily, Disp: 90 tablet, Rfl: 3    Multiple Vitamins-Minerals (EYE VITAMINS) CAPS, Take 1 capsule by mouth daily, Disp: , Rfl:     Multiple Vitamins-Minerals (MULTIVITAMIN ADULT PO), Take 1 tablet by mouth daily, Disp: , Rfl:     pantoprazole (PROTONIX) 40 mg tablet, Take 1 tablet (40 mg total) by mouth daily, Disp: 90 tablet, Rfl: 3    tamsulosin (FLOMAX) 0 4 mg, TAKE 1 CAPSULE BY MOUTH  EVERY DAY 1/2 HOUR  FOLLOWING THE SAME MEAL  EACH DAY, Disp: 90 capsule, Rfl: 2    TOUJEO SOLOSTAR 300 units/mL CONCETRATED U-300 injection pen, Inject 64 units daily as directed, Disp: 27 mL, Rfl: 0    Insulin Pen Needle (B-D UF III MINI PEN NEEDLES) 31G X 5 MM MISC, by Does not apply route 5 (five) times a day for 90 days, Disp: 500 each, Rfl: 3    The following portions of the patient's history were reviewed and updated as appropriate: allergies, current medications, past family history, past medical history, past social history, past surgical history and problem list     Review of Systems   Constitutional: Negative for appetite change, fatigue, fever and unexpected weight change  HENT: Negative for rhinorrhea, sinus pressure, sinus pain, sneezing and sore throat  Eyes: Negative for visual disturbance  Respiratory: Negative for cough, chest tightness, shortness of breath and wheezing  Cardiovascular: Negative for chest pain, palpitations and leg swelling  Gastrointestinal: Negative for abdominal distention, abdominal pain, blood in stool, constipation, diarrhea, nausea and vomiting  Endocrine: Negative for polydipsia and polyuria  Genitourinary: Negative for decreased urine volume, difficulty urinating, dysuria, hematuria and urgency  Musculoskeletal: Negative for arthralgias, back pain, joint swelling and neck pain  Skin: Negative for rash  Allergic/Immunologic: Negative for environmental allergies  Neurological: Negative for tremors, weakness, light-headedness, numbness and headaches  Hematological: Does not bruise/bleed easily  Psychiatric/Behavioral: Negative for agitation, behavioral problems, confusion and dysphoric mood  The patient is not nervous/anxious            Family History   Problem Relation Age of Onset    Diabetes type II Maternal Grandfather     Diabetes type II Paternal Grandfather     Coronary artery disease Father     Kidney disease Sister     Ovarian cancer Maternal Grandmother        Past Medical History:   Diagnosis Date    Aortic stenosis     Benign hypertension     Chronic diastolic heart failure ([place] [address] )     Colon polyps     Diabetes ([place] [address] )     Diverticulitis     GERD (gastroesophageal reflux disease)     Hypertension     Hypothyroidism     Obstructive sleep apnea syndrome     Prostatism     Sleep apnea     Stomach ulcer     Stroke ([place] [address] )     Thyroid disease     Vitamin D deficiency        Past Surgical History:   Procedure Laterality Date    CATARACT EXTRACTION      REPLACEMENT TOTAL KNEE      SINUS SURGERY      with deviated septum       Social History     Socioeconomic History    Marital status: /Civil Union     Spouse name: None    Number of children: None    Years of education: None    Highest education level: None   Occupational History    Occupation: retired   Social Needs    Financial resource strain: None    Food insecurity:     Worry: None     Inability: None    Transportation needs:     Medical: None     Non-medical: None   Tobacco Use    Smoking status: Former Smoker     Types: Cigarettes     Last attempt to quit:      Years since quittin 1    Smokeless tobacco: Never Used   Substance and Sexual Activity    Alcohol use: No    Drug use: No    Sexual activity: Never     Partners: Female     Birth control/protection: Abstinence   Lifestyle    Physical activity:     Days per week: None     Minutes per session: None    Stress: None   Relationships    Social connections:     Talks on phone: None     Gets together: None     Attends Baptism service: None     Active member of club or organization: None     Attends meetings of clubs or organizations: None     Relationship status: None    Intimate partner violence:     Fear of current or ex partner: None     Emotionally abused: None     Physically abused: None     Forced sexual activity: None   Other Topics Concern    None   Social History Narrative    Pt states he has 1 cup of tea daily       Allergies   Allergen Reactions    No Known Allergies          Objective:      /70   Pulse 62   Temp 98 9 °F (37 2 °C)   Resp 18   Ht 5' 9" (1 753 m)   Wt 111 kg (244 lb)   SpO2 95%   BMI 36 03 kg/m²        Physical Exam   Constitutional: He is oriented to person, place, and time  He appears well-developed and well-nourished  No distress  Severely obese and on oxygen   HENT:   Head: Normocephalic and atraumatic  Nose: Nose normal    Mouth/Throat: Oropharynx is clear and moist  No oropharyngeal exudate  Eyes: Pupils are equal, round, and reactive to light  Conjunctivae and EOM are normal  No scleral icterus  Neck: Normal range of motion  Neck supple  No JVD present  No tracheal deviation present  No thyromegaly present  Cardiovascular: Normal rate, regular rhythm and normal heart sounds  Exam reveals no gallop and no friction rub  No murmur heard  Pulmonary/Chest: Effort normal  No respiratory distress  He has no wheezes  He has no rales  He exhibits no tenderness  Abdominal: Soft  Bowel sounds are normal  He exhibits no distension and no mass  There is no tenderness  There is no rebound and no guarding  Musculoskeletal: Normal range of motion  He exhibits edema (Plus one pretibial edema bilaterally no sacral edema)  He exhibits no deformity  Lymphadenopathy:     He has no cervical adenopathy  Neurological: He is alert and oriented to person, place, and time  No cranial nerve deficit  Coordination normal    Skin: Skin is warm and dry  No rash noted  Psychiatric: He has a normal mood and affect   His behavior is normal  Judgment and thought content normal

## 2019-03-06 LAB — HBA1C MFR BLD HPLC: 6.8 %

## 2019-03-08 ENCOUNTER — OFFICE VISIT (OUTPATIENT)
Dept: ENDOCRINOLOGY | Facility: CLINIC | Age: 79
End: 2019-03-08
Payer: MEDICARE

## 2019-03-08 VITALS
HEIGHT: 69 IN | WEIGHT: 245.4 LBS | HEART RATE: 72 BPM | SYSTOLIC BLOOD PRESSURE: 122 MMHG | DIASTOLIC BLOOD PRESSURE: 60 MMHG | BODY MASS INDEX: 36.35 KG/M2

## 2019-03-08 DIAGNOSIS — E78.5 HYPERLIPIDEMIA, UNSPECIFIED HYPERLIPIDEMIA TYPE: ICD-10-CM

## 2019-03-08 DIAGNOSIS — E55.9 VITAMIN D DEFICIENCY: ICD-10-CM

## 2019-03-08 DIAGNOSIS — Z79.4 TYPE 2 DIABETES MELLITUS WITH OTHER OPHTHALMIC COMPLICATION, WITH LONG-TERM CURRENT USE OF INSULIN (HCC): Primary | ICD-10-CM

## 2019-03-08 DIAGNOSIS — E03.9 HYPOTHYROIDISM, UNSPECIFIED TYPE: ICD-10-CM

## 2019-03-08 DIAGNOSIS — E11.39 TYPE 2 DIABETES MELLITUS WITH OTHER OPHTHALMIC COMPLICATION, WITH LONG-TERM CURRENT USE OF INSULIN (HCC): Primary | ICD-10-CM

## 2019-03-08 DIAGNOSIS — I10 HYPERTENSION, UNSPECIFIED TYPE: ICD-10-CM

## 2019-03-08 PROCEDURE — 99214 OFFICE O/P EST MOD 30 MIN: CPT | Performed by: INTERNAL MEDICINE

## 2019-03-08 RX ORDER — INSULIN GLARGINE 300 U/ML
INJECTION, SOLUTION SUBCUTANEOUS
Qty: 27 ML | Refills: 3
Start: 2019-03-08 | End: 2019-05-21 | Stop reason: SDUPTHER

## 2019-03-08 RX ORDER — INSULIN GLARGINE 300 U/ML
INJECTION, SOLUTION SUBCUTANEOUS
Qty: 27 ML | Refills: 3
Start: 2019-03-08 | End: 2019-03-08 | Stop reason: SDUPTHER

## 2019-03-08 NOTE — ASSESSMENT & PLAN NOTE
Lab Results   Component Value Date    HGBA1C 6 8 03/06/2019     Well controlled with occasional hypoglycemic episodes   decrease the pre breakafst dose to 14 , continue the rest

## 2019-03-08 NOTE — PROGRESS NOTES
Jere Saravia 67 y o  male MRN: [id number]    Encounter: [id number]      Assessment/Plan     Problem List Items Addressed This Visit        Endocrine    Hypothyroidism     Continue levothyroxine at current dose          Relevant Orders    TSH, 3rd generation Lab Collect    T4, free Lab Collect    Type 2 diabetes mellitus with ophthalmic complication, with long-term current use of insulin ([address] ) - Primary     Lab Results   Component Value Date    HGBA1C 6 8 03/06/2019     Well controlled with occasional hypoglycemic episodes   decrease the pre breakafst dose to 14 , continue the rest             Relevant Medications    insulin lispro (HUMALOG KWIKPEN) 100 units/mL injection pen    TOUJEO SOLOSTAR 300 units/mL CONCETRATED U-300 injection pen    Other Relevant Orders    HEMOGLOBIN A1C W/ EAG ESTIMATION Lab Collect    Comprehensive metabolic panel Lab Collect       Cardiovascular and Mediastinum    Hypertension     Bp at goal, continue current regimen          Relevant Orders    Comprehensive metabolic panel Lab Collect       Other    Hyperlipidemia     Continue statins         Relevant Orders    Comprehensive metabolic panel Lab Collect    Vitamin D deficiency     Continue supplementations         Relevant Orders    Comprehensive metabolic panel Lab Collect        CC: Diabetes    History of Present Illness     HPI: 68 y/o male with history of type 2 Diabetes on long term insulin therapy , complicated by retinopathy here for followup   Current regimen -   toujeo 64 units at bedtime   humalog 16-12-45-0   has been checking f s 4/day - most sugars have been between 90-180s with occasional excursions   Occasional hypoglycemia before lunch ,  C/o polyuria , polydipsia , no blurry vision  Recently saw the optho and will be receiving injection for retinopathy  No numbness and tingling in feet    Review of Systems   Constitutional: Negative for fatigue and unexpected weight change     Eyes: Negative for visual disturbance  Respiratory: Negative for cough and shortness of breath  Cardiovascular: Negative for palpitations and leg swelling  Gastrointestinal: Negative for constipation, diarrhea, nausea and vomiting  Musculoskeletal: Positive for arthralgias  Negative for gait problem  Skin: Negative for wound  Psychiatric/Behavioral: Negative for confusion and sleep disturbance  All other systems reviewed and are negative        Historical Information   Past Medical History:   Diagnosis Date    Aortic stenosis     Benign hypertension     Chronic diastolic heart failure (HCC)     Colon polyps     Diabetes (HCC)     Diverticulitis     GERD (gastroesophageal reflux disease)     Hypertension     Hypothyroidism     Obstructive sleep apnea syndrome     Prostatism     Sleep apnea     Stomach ulcer     [facility]     Thyroid disease     Vitamin D deficiency      Past Surgical History:   Procedure Laterality Date    CATARACT EXTRACTION      REPLACEMENT TOTAL KNEE      SINUS SURGERY      with deviated septum     Social History   Social History     Substance and Sexual Activity   Alcohol Use No     Social History     Substance and Sexual Activity   Drug Use No     Social History     Tobacco Use   Smoking Status Former Smoker    Types: Cigarettes    Last attempt to quit:     Years since quittin 2   Smokeless Tobacco Never Used     Family History:   Family History   Problem Relation Age of Onset    Diabetes type II Maternal Grandfather     Diabetes type II Paternal Grandfather     Coronary artery disease Father     Kidney disease Sister     Ovarian cancer Maternal Grandmother        Meds/Allergies   Current Outpatient Medications   Medication Sig Dispense Refill    aspirin (ASPIRIN LOW DOSE) 81 MG tablet every 24 hours      atorvastatin (LIPITOR) 40 mg tablet Take 1 tablet (40 mg total) by mouth daily 90 tablet 3    ferrous sulfate 325 (65 Fe) mg tablet Take 1 tablet (325 mg total) by mouth 2 (two) times a day with meals 180 tablet 3    furosemide (LASIX) 80 mg tablet TAKE 1 TABLET BY MOUTH TWO  TIMES DAILY 180 tablet 3    glucose blood (FREESTYLE LITE) test strip Use 4/day 400 each 3    insulin lispro (HUMALOG KWIKPEN) 100 units/mL injection pen INJECT SUBCUTANEOUSLY 14  UNITS BEFORE BREAKFAST, 12  UNITS BEFORE LUNCH, 45  UNITS BEFORE SUPPER, AND 20 UNITS BEFORE AT BEDTIME 32 pen 3    levothyroxine 88 mcg tablet Take 1 tablet (88 mcg total) by mouth daily 90 tablet 3    lisinopril (ZESTRIL) 20 mg tablet Take 1 tablet (20 mg total) by mouth daily 90 tablet 3    metolazone (ZAROXOLYN) 2 5 mg tablet take 1 tablet by oral route  every week      metoprolol succinate (TOPROL-XL) 25 mg 24 hr tablet Take 1 tablet (25 mg total) by mouth daily 90 tablet 3    Multiple Vitamins-Minerals (EYE VITAMINS) CAPS Take 1 capsule by mouth daily      Multiple Vitamins-Minerals (MULTIVITAMIN ADULT PO) Take 1 tablet by mouth daily      pantoprazole (PROTONIX) 40 mg tablet Take 1 tablet (40 mg total) by mouth daily 90 tablet 3    tamsulosin (FLOMAX) 0 4 mg TAKE 1 CAPSULE BY MOUTH  EVERY DAY 1/2 HOUR  FOLLOWING THE SAME MEAL  EACH DAY 90 capsule 2    TOUJEO SOLOSTAR 300 units/mL CONCETRATED U-300 injection pen Inject 60 units daily as directed 27 mL 3    Insulin Pen Needle (B-D UF III MINI PEN NEEDLES) 31G X 5 MM MISC by Does not apply route 5 (five) times a day for 90 days 500 each 3     No current facility-administered medications for this visit  Allergies   Allergen Reactions    No Known Allergies        Objective   Vitals: Blood pressure 122/60, pulse 72, height 5' 9" (1 753 m), weight 111 kg (245 lb 6 4 oz)  Physical Exam   Constitutional: He is oriented to person, place, and time  He appears well-developed and well-nourished  No distress  HENT:   Head: Normocephalic and atraumatic  Mouth/Throat: No oropharyngeal exudate  Eyes: Pupils are equal, round, and reactive to light   EOM are normal  No scleral icterus  Neck: Normal range of motion  Neck supple  No thyromegaly present  Cardiovascular: Normal rate, regular rhythm and normal heart sounds  No murmur heard  Pulmonary/Chest: Effort normal and breath sounds normal  No stridor  No respiratory distress  Abdominal: Soft  Bowel sounds are normal  He exhibits no distension and no mass  Musculoskeletal: Normal range of motion  He exhibits no edema or deformity  Lymphadenopathy:     He has no cervical adenopathy  Neurological: He is alert and oriented to person, place, and time  Skin: Skin is warm and dry  Psychiatric: He has a normal mood and affect  His behavior is normal  Judgment and thought content normal        The history was obtained from the review of the chart, patient      Lab Results:   Lab Results   Component Value Date/Time    Hemoglobin A1C 6 8 03/06/2019    Hemoglobin A1C 7 2 10/22/2018    Hemoglobin A1C 6 7 (H) 06/07/2018 08:47 AM    White Blood Cell Count 5 9 11/20/2018 07:25 AM    White Blood Cell Count 5 9 10/15/2018 07:29 AM    White Blood Cell Count 5 9 09/10/2018 07:34 AM    Hemoglobin 13 8 11/20/2018 07:25 AM    Hemoglobin 14 1 10/15/2018 07:29 AM    Hemoglobin 13 6 09/10/2018 07:34 AM    HCT 40 8 11/20/2018 07:25 AM    HCT 42 8 10/15/2018 07:29 AM    HCT 41 1 09/10/2018 07:34 AM    MCV 89 5 11/20/2018 07:25 AM    MCV 90 5 10/15/2018 07:29 AM    MCV 92 8 09/10/2018 07:34 AM    Platelet Count 484 51/21/0290 07:25 AM    Platelet Count 372 33/36/4896 07:29 AM    Platelet Count 999 18/35/2088 07:34 AM    BUN 45 (H) 11/20/2018 07:25 AM    BUN 47 (H) 10/15/2018 07:29 AM    BUN 48 (H) 09/10/2018 07:34 AM    Potassium 4 9 11/20/2018 07:25 AM    Potassium 4 2 10/15/2018 07:29 AM    Potassium 4 7 09/10/2018 07:34 AM    Chloride 104 11/20/2018 07:25 AM    Chloride 103 10/15/2018 07:29 AM    Chloride 105 09/10/2018 07:34 AM    CO2 36 (H) 11/20/2018 07:25 AM    CO2 30 10/15/2018 07:29 AM    CO2 32 09/10/2018 07:34 AM AST 18 11/20/2018 07:25 AM    AST 18 10/15/2018 07:29 AM    AST 18 09/10/2018 07:34 AM    ALT 15 11/20/2018 07:25 AM    ALT 16 10/15/2018 07:29 AM    ALT 16 09/10/2018 07:34 AM    Albumin 3 9 11/20/2018 07:25 AM    Albumin 3 9 10/15/2018 07:29 AM    Albumin 3 8 09/10/2018 07:34 AM    Globulin 2 5 11/20/2018 07:25 AM    Globulin 2 6 10/15/2018 07:29 AM    Globulin 2 4 09/10/2018 07:34 AM             Portions of the record may have been created with voice recognition software  Occasional wrong word or "sound a like" substitutions may have occurred due to the inherent limitations of voice recognition software  Read the chart carefully and recognize, using context, where substitutions have occurred

## 2019-04-12 ENCOUNTER — TELEPHONE (OUTPATIENT)
Dept: ENDOCRINOLOGY | Facility: CLINIC | Age: 79
End: 2019-04-12

## 2019-04-22 ENCOUNTER — TELEPHONE (OUTPATIENT)
Dept: ENDOCRINOLOGY | Facility: CLINIC | Age: 79
End: 2019-04-22

## 2019-05-13 ENCOUNTER — TELEPHONE (OUTPATIENT)
Dept: ENDOCRINOLOGY | Facility: CLINIC | Age: 79
End: 2019-05-13

## 2019-05-21 DIAGNOSIS — Z79.4 TYPE 2 DIABETES MELLITUS WITH OTHER OPHTHALMIC COMPLICATION, WITH LONG-TERM CURRENT USE OF INSULIN (HCC): ICD-10-CM

## 2019-05-21 DIAGNOSIS — E11.39 TYPE 2 DIABETES MELLITUS WITH OTHER OPHTHALMIC COMPLICATION, WITH LONG-TERM CURRENT USE OF INSULIN (HCC): ICD-10-CM

## 2019-05-21 RX ORDER — INSULIN GLARGINE 300 U/ML
60 INJECTION, SOLUTION SUBCUTANEOUS DAILY
Qty: 12 PEN | Refills: 1 | Status: SHIPPED | OUTPATIENT
Start: 2019-05-21 | End: 2019-11-13 | Stop reason: DRUGHIGH

## 2019-05-29 ENCOUNTER — TELEPHONE (OUTPATIENT)
Dept: ENDOCRINOLOGY | Facility: CLINIC | Age: 79
End: 2019-05-29

## 2019-06-04 ENCOUNTER — OFFICE VISIT (OUTPATIENT)
Dept: FAMILY MEDICINE CLINIC | Facility: CLINIC | Age: 79
End: 2019-06-04
Payer: MEDICARE

## 2019-06-04 VITALS
SYSTOLIC BLOOD PRESSURE: 128 MMHG | DIASTOLIC BLOOD PRESSURE: 72 MMHG | WEIGHT: 242 LBS | HEIGHT: 70 IN | OXYGEN SATURATION: 96 % | BODY MASS INDEX: 34.65 KG/M2 | HEART RATE: 63 BPM

## 2019-06-04 DIAGNOSIS — E03.8 OTHER SPECIFIED HYPOTHYROIDISM: ICD-10-CM

## 2019-06-04 DIAGNOSIS — Z79.4 TYPE 2 DIABETES MELLITUS WITH OTHER OPHTHALMIC COMPLICATION, WITH LONG-TERM CURRENT USE OF INSULIN (HCC): Primary | ICD-10-CM

## 2019-06-04 DIAGNOSIS — E78.2 MIXED HYPERLIPIDEMIA: ICD-10-CM

## 2019-06-04 DIAGNOSIS — N18.2 STAGE 2 CHRONIC KIDNEY DISEASE: ICD-10-CM

## 2019-06-04 DIAGNOSIS — C22.1 CHOLANGIOCARCINOMA (HCC): ICD-10-CM

## 2019-06-04 DIAGNOSIS — E11.39 TYPE 2 DIABETES MELLITUS WITH OTHER OPHTHALMIC COMPLICATION, WITH LONG-TERM CURRENT USE OF INSULIN (HCC): Primary | ICD-10-CM

## 2019-06-04 PROBLEM — Z51.89 CONVALESCENCE FOLLOWING CHEMOTHERAPY: Status: ACTIVE | Noted: 2019-01-08

## 2019-06-04 PROBLEM — R16.0 LIVER MASS: Status: RESOLVED | Noted: 2018-11-23 | Resolved: 2019-06-04

## 2019-06-04 PROBLEM — D13.4: Status: ACTIVE | Noted: 2019-01-07

## 2019-06-04 PROCEDURE — 99214 OFFICE O/P EST MOD 30 MIN: CPT | Performed by: INTERNAL MEDICINE

## 2019-06-10 ENCOUNTER — TELEPHONE (OUTPATIENT)
Dept: ENDOCRINOLOGY | Facility: CLINIC | Age: 79
End: 2019-06-10

## 2019-06-11 LAB — HBA1C MFR BLD HPLC: 7.2 %

## 2019-06-14 ENCOUNTER — TELEPHONE (OUTPATIENT)
Dept: ENDOCRINOLOGY | Facility: CLINIC | Age: 79
End: 2019-06-14

## 2019-06-25 ENCOUNTER — OFFICE VISIT (OUTPATIENT)
Dept: ENDOCRINOLOGY | Facility: CLINIC | Age: 79
End: 2019-06-25
Payer: MEDICARE

## 2019-06-25 VITALS
HEIGHT: 70 IN | WEIGHT: 245.6 LBS | BODY MASS INDEX: 35.16 KG/M2 | SYSTOLIC BLOOD PRESSURE: 122 MMHG | DIASTOLIC BLOOD PRESSURE: 50 MMHG | HEART RATE: 64 BPM

## 2019-06-25 DIAGNOSIS — I10 HYPERTENSION, UNSPECIFIED TYPE: ICD-10-CM

## 2019-06-25 DIAGNOSIS — E11.39 TYPE 2 DIABETES MELLITUS WITH OTHER OPHTHALMIC COMPLICATION, WITH LONG-TERM CURRENT USE OF INSULIN (HCC): Primary | ICD-10-CM

## 2019-06-25 DIAGNOSIS — E55.9 VITAMIN D DEFICIENCY: ICD-10-CM

## 2019-06-25 DIAGNOSIS — Z79.4 TYPE 2 DIABETES MELLITUS WITH OTHER OPHTHALMIC COMPLICATION, WITH LONG-TERM CURRENT USE OF INSULIN (HCC): Primary | ICD-10-CM

## 2019-06-25 DIAGNOSIS — E03.9 HYPOTHYROIDISM, UNSPECIFIED TYPE: ICD-10-CM

## 2019-06-25 DIAGNOSIS — E78.5 HYPERLIPIDEMIA, UNSPECIFIED HYPERLIPIDEMIA TYPE: ICD-10-CM

## 2019-06-25 PROCEDURE — 99214 OFFICE O/P EST MOD 30 MIN: CPT | Performed by: PHYSICIAN ASSISTANT

## 2019-07-01 DIAGNOSIS — I50.32 CHRONIC HEART FAILURE WITH PRESERVED EJECTION FRACTION (HCC): Primary | ICD-10-CM

## 2019-07-02 RX ORDER — METOLAZONE 2.5 MG/1
TABLET ORAL
Qty: 90 TABLET | Refills: 1 | Status: SHIPPED | OUTPATIENT
Start: 2019-07-02 | End: 2019-11-11 | Stop reason: SDUPTHER

## 2019-08-07 NOTE — PROGRESS NOTES
Assessment/Plan:  Patient is almost finished his immunotherapy and has finished his radiation therapy as of June  CT scan done in early July demonstrated slight reduction in the size of his large liver mass by approximately half a cm  He will have repeat his CT scan done upon completion of his immunotherapy  Although he notes fatigue he has otherwise tolerated his treatment for a well and his weight has remained stable without significant weight loss  Pulmonary nodules are noted to be stable and small  CT scan does demonstrate small bilateral pleural effusions however ankle edema is minimal   The patient is oxygen dependent  He continues with Lasix 80 mg daily  Laboratory studies are stable  He will continue with the current medical regime  Diet reviewed  Lifestyle modifications reviewed  Medications reviewed and ordered  Laboratory tests and studies reviewed and ordered  All patient's questions answered to patient satisfaction  No chief complaint on file  Diagnoses and all orders for this visit:    Type 2 diabetes mellitus with other ophthalmic complication, with long-term current use of insulin ([facility] [address] )    Mixed hyperlipidemia    Cholangiocarcinoma ([facility] [address] )    Other specified hypothyroidism        Subjective: This 50-year-old male and has finished his course of radiation therapy as of the end of June and has 2 more immunotherapy treatments to go for his large hepatic tumor  Following radiation therapy he had a CT scan which demonstrated a slight 0 5 cm decrease in the size of hepatic tumor  Small lung nodules have not changed in size  He also has small bilateral pleural effusions  He will have repeat CT scan done following the completion of his immunotherapy  Patient's weight remains stable edema is very well controlled  Laboratory studies remained stable at especially renal function which remains stable  Patient wears oxygen the chronically    He is comfortable at rest        Patient ID: Katrina Wren is a 67 y o  male          Current Outpatient Medications:     atorvastatin (LIPITOR) 40 mg tablet, Take 1 tablet (40 mg total) by mouth daily, Disp: 90 tablet, Rfl: 3    ferrous sulfate 325 (65 Fe) mg tablet, Take 1 tablet (325 mg total) by mouth 2 (two) times a day with meals, Disp: 180 tablet, Rfl: 3    furosemide (LASIX) 80 mg tablet, TAKE 1 TABLET BY MOUTH TWO  TIMES DAILY, Disp: 180 tablet, Rfl: 3    glucose blood (FREESTYLE LITE) test strip, Use 4/day DX E11 9, insulin treated, Disp: 400 each, Rfl: 3    insulin lispro (HUMALOG KWIKPEN) 100 units/mL injection pen, INJECT SUBCUTANEOUSLY 14  UNITS BEFORE BREAKFAST, 12  UNITS BEFORE LUNCH, 45  UNITS BEFORE SUPPER, AND 20 UNITS BEFORE AT BEDTIME, Disp: 32 pen, Rfl: 3    Insulin Pen Needle (B-D UF III MINI PEN NEEDLES) 31G X 5 MM MISC, by Does not apply route 5 (five) times a day for 90 days, Disp: 500 each, Rfl: 3    levothyroxine 88 mcg tablet, Take 1 tablet (88 mcg total) by mouth daily, Disp: 90 tablet, Rfl: 3    lisinopril (ZESTRIL) 20 mg tablet, Take 1 tablet (20 mg total) by mouth daily, Disp: 90 tablet, Rfl: 3    metolazone (ZAROXOLYN) 2 5 mg tablet, TAKE 1 TABLET BY MOUTH  EVERY DAY, Disp: 90 tablet, Rfl: 1    metoprolol succinate (TOPROL-XL) 25 mg 24 hr tablet, Take 1 tablet (25 mg total) by mouth daily, Disp: 90 tablet, Rfl: 3    Multiple Vitamins-Minerals (EYE VITAMINS) CAPS, Take 1 capsule by mouth daily, Disp: , Rfl:     Multiple Vitamins-Minerals (MULTIVITAMIN ADULT PO), Take 1 tablet by mouth daily, Disp: , Rfl:     pantoprazole (PROTONIX) 40 mg tablet, Take 1 tablet (40 mg total) by mouth daily, Disp: 90 tablet, Rfl: 3    tamsulosin (FLOMAX) 0 4 mg, TAKE 1 CAPSULE BY MOUTH  EVERY DAY 1/2 HOUR  FOLLOWING THE SAME MEAL  EACH DAY, Disp: 90 capsule, Rfl: 2    TOUJEO SOLOSTAR 300 units/mL CONCETRATED U-300 injection pen, Inject 60 Units under the skin daily for 180 days Inject 60 units daily as directed, Disp: 12 pen, Rfl: 1    The following portions of the patient's history were reviewed and updated as appropriate: allergies, current medications, past family history, past medical history, past social history, past surgical history and problem list     Review of Systems   Constitutional: Positive for fatigue  Negative for appetite change, fever and unexpected weight change  HENT: Negative for rhinorrhea, sinus pressure, sinus pain, sneezing and sore throat  Eyes: Negative for visual disturbance  Respiratory: Negative for cough, chest tightness, shortness of breath and wheezing  Cardiovascular: Negative for chest pain, palpitations and leg swelling  Gastrointestinal: Negative for abdominal distention, abdominal pain, blood in stool, constipation, diarrhea, nausea and vomiting  Endocrine: Negative for polydipsia and polyuria  Genitourinary: Negative for decreased urine volume, difficulty urinating, dysuria, hematuria and urgency  Musculoskeletal: Negative for arthralgias, back pain, joint swelling and neck pain  Skin: Negative for rash  Allergic/Immunologic: Negative for environmental allergies  Neurological: Negative for tremors, weakness, light-headedness, numbness and headaches  Hematological: Does not bruise/bleed easily  Psychiatric/Behavioral: Negative for agitation, behavioral problems, confusion and dysphoric mood  The patient is not nervous/anxious            Family History   Problem Relation Age of Onset    Diabetes type II Maternal Grandfather     Diabetes type II Paternal Grandfather     Coronary artery disease Father     Kidney disease Sister     Ovarian cancer Maternal Grandmother        Past Medical History:   Diagnosis Date    Aortic stenosis     Benign hypertension     Chronic diastolic heart failure (HCC)     Colon polyps     Diabetes (HCC)     Diverticulitis     GERD (gastroesophageal reflux disease)     Hypertension     Hypothyroidism     Obstructive sleep apnea syndrome  Prostatism     Sleep apnea     Stomach ulcer     Stroke ([facility] [address] )     Thyroid disease     Vitamin D deficiency        Past Surgical History:   Procedure Laterality Date    CATARACT EXTRACTION      REPLACEMENT TOTAL KNEE      SINUS SURGERY      with deviated septum       Social History     Socioeconomic History    Marital status: /Civil Union     Spouse name: None    Number of children: None    Years of education: None    Highest education level: None   Occupational History    Occupation: retired   Social Needs    Financial resource strain: None    Food insecurity:     Worry: None     Inability: None    Transportation needs:     Medical: None     Non-medical: None   Tobacco Use    Smoking status: Former Smoker     Types: Cigarettes     Last attempt to quit:      Years since quittin 6    Smokeless tobacco: Never Used   Substance and Sexual Activity    Alcohol use: No    Drug use: No    Sexual activity: Never     Partners: Female     Birth control/protection: Abstinence   Lifestyle    Physical activity:     Days per week: None     Minutes per session: None    Stress: None   Relationships    Social connections:     Talks on phone: None     Gets together: None     Attends [employer] service: None     Active member of club or organization: None     Attends meetings of clubs or organizations: None     Relationship status: None    Intimate partner violence:     Fear of current or ex partner: None     Emotionally abused: None     Physically abused: None     Forced sexual activity: None   Other Topics Concern    None   Social History Narrative    Pt states he has 1 cup of tea daily       Allergies   Allergen Reactions    No Known Allergies        BMI Counseling: Body mass index is 35 88 kg/m²  Discussed the patient's BMI with him  The BMI is above average  BMI counseling and education was provided to the patient   Nutrition recommendations include decreasing overall calorie intake  Objective:    /70   Pulse (!) 51   Temp 98 9 °F (37 2 °C)   Resp 17   Ht 5' 9" (1 753 m)   Wt 110 kg (243 lb)   SpO2 95%   BMI 35 88 kg/m²        Physical Exam   Constitutional: He is oriented to person, place, and time  He appears well-developed and well-nourished  No distress  Severely obese  Nasal oxygen in place  HENT:   Head: Normocephalic and atraumatic  Nose: Nose normal    Mouth/Throat: Oropharynx is clear and moist  No oropharyngeal exudate  Eyes: Pupils are equal, round, and reactive to light  Conjunctivae and EOM are normal  No scleral icterus  Neck: Normal range of motion  Neck supple  No JVD present  No tracheal deviation present  No thyromegaly present  Cardiovascular: Normal rate, regular rhythm and normal heart sounds  Exam reveals no gallop and no friction rub  No murmur heard  Pulmonary/Chest: Effort normal  No respiratory distress  He has no wheezes  He has no rales  He exhibits no tenderness  Abdominal: Soft  Bowel sounds are normal  He exhibits no distension and no mass  There is no tenderness  There is no rebound and no guarding  Musculoskeletal: Normal range of motion  He exhibits edema (Bilateral +1 edema pretibially)  He exhibits no deformity  Lymphadenopathy:     He has no cervical adenopathy  Neurological: He is alert and oriented to person, place, and time  No cranial nerve deficit  Coordination normal    Skin: Skin is warm and dry  No rash noted  Psychiatric: He has a normal mood and affect   His behavior is normal  Judgment and thought content normal

## 2019-08-08 ENCOUNTER — OFFICE VISIT (OUTPATIENT)
Dept: FAMILY MEDICINE CLINIC | Facility: CLINIC | Age: 79
End: 2019-08-08
Payer: MEDICARE

## 2019-08-08 VITALS
HEIGHT: 69 IN | TEMPERATURE: 98.9 F | BODY MASS INDEX: 35.99 KG/M2 | OXYGEN SATURATION: 95 % | SYSTOLIC BLOOD PRESSURE: 144 MMHG | HEART RATE: 51 BPM | WEIGHT: 243 LBS | RESPIRATION RATE: 17 BRPM | DIASTOLIC BLOOD PRESSURE: 70 MMHG

## 2019-08-08 DIAGNOSIS — C22.1 CHOLANGIOCARCINOMA (HCC): ICD-10-CM

## 2019-08-08 DIAGNOSIS — E11.39 TYPE 2 DIABETES MELLITUS WITH OTHER OPHTHALMIC COMPLICATION, WITH LONG-TERM CURRENT USE OF INSULIN (HCC): Primary | ICD-10-CM

## 2019-08-08 DIAGNOSIS — Z79.4 TYPE 2 DIABETES MELLITUS WITH OTHER OPHTHALMIC COMPLICATION, WITH LONG-TERM CURRENT USE OF INSULIN (HCC): Primary | ICD-10-CM

## 2019-08-08 DIAGNOSIS — E03.8 OTHER SPECIFIED HYPOTHYROIDISM: ICD-10-CM

## 2019-08-08 DIAGNOSIS — E78.2 MIXED HYPERLIPIDEMIA: ICD-10-CM

## 2019-08-08 PROCEDURE — 99214 OFFICE O/P EST MOD 30 MIN: CPT | Performed by: INTERNAL MEDICINE

## 2019-08-16 ENCOUNTER — TELEPHONE (OUTPATIENT)
Dept: ENDOCRINOLOGY | Facility: CLINIC | Age: 79
End: 2019-08-16

## 2019-08-28 ENCOUNTER — TELEPHONE (OUTPATIENT)
Dept: ENDOCRINOLOGY | Facility: CLINIC | Age: 79
End: 2019-08-28

## 2019-09-04 ENCOUNTER — TELEPHONE (OUTPATIENT)
Dept: ENDOCRINOLOGY | Facility: CLINIC | Age: 79
End: 2019-09-04

## 2019-09-16 ENCOUNTER — TELEPHONE (OUTPATIENT)
Dept: ENDOCRINOLOGY | Facility: CLINIC | Age: 79
End: 2019-09-16

## 2019-09-18 DIAGNOSIS — R34 URINE OUTPUT LOW: ICD-10-CM

## 2019-09-18 RX ORDER — TAMSULOSIN HYDROCHLORIDE 0.4 MG/1
0.4 CAPSULE ORAL
Qty: 90 CAPSULE | Refills: 2 | Status: SHIPPED | OUTPATIENT
Start: 2019-09-18

## 2019-10-07 ENCOUNTER — TELEPHONE (OUTPATIENT)
Dept: ENDOCRINOLOGY | Facility: CLINIC | Age: 79
End: 2019-10-07

## 2019-10-08 NOTE — TELEPHONE ENCOUNTER
Mostly high after breakfast/lunch  Increase breakfast/lunch dose of insulin from 16 to 18 units  Send BG log in two weeks or ASAP if problems

## 2019-10-08 NOTE — TELEPHONE ENCOUNTER
Left message to pt to increase his breakfast and lunch insulin from 16 U to 18 U    To send log in 2 wks or sooner if any problems

## 2019-10-21 LAB — HBA1C MFR BLD HPLC: 7.6 %

## 2019-10-29 ENCOUNTER — OFFICE VISIT (OUTPATIENT)
Dept: ENDOCRINOLOGY | Facility: CLINIC | Age: 79
End: 2019-10-29
Payer: MEDICARE

## 2019-10-29 VITALS
BODY MASS INDEX: 36.26 KG/M2 | WEIGHT: 244.8 LBS | DIASTOLIC BLOOD PRESSURE: 50 MMHG | HEIGHT: 69 IN | SYSTOLIC BLOOD PRESSURE: 126 MMHG | HEART RATE: 67 BPM

## 2019-10-29 DIAGNOSIS — E11.39 TYPE 2 DIABETES MELLITUS WITH OTHER OPHTHALMIC COMPLICATION, WITH LONG-TERM CURRENT USE OF INSULIN (HCC): Primary | ICD-10-CM

## 2019-10-29 DIAGNOSIS — Z79.4 TYPE 2 DIABETES MELLITUS WITH OTHER OPHTHALMIC COMPLICATION, WITH LONG-TERM CURRENT USE OF INSULIN (HCC): Primary | ICD-10-CM

## 2019-10-29 DIAGNOSIS — E78.5 HYPERLIPIDEMIA, UNSPECIFIED HYPERLIPIDEMIA TYPE: ICD-10-CM

## 2019-10-29 DIAGNOSIS — E03.9 HYPOTHYROIDISM, UNSPECIFIED TYPE: ICD-10-CM

## 2019-10-29 DIAGNOSIS — I10 HYPERTENSION, UNSPECIFIED TYPE: ICD-10-CM

## 2019-10-29 PROCEDURE — 99214 OFFICE O/P EST MOD 30 MIN: CPT | Performed by: INTERNAL MEDICINE

## 2019-10-29 NOTE — ASSESSMENT & PLAN NOTE
Lab Results   Component Value Date    HGBA1C 7 6 10/21/2019   Sugars have been high recently-continue current regimen and watch diet

## 2019-10-29 NOTE — PROGRESS NOTES
George Saravia 67 y o  male MRN: [id number]    Encounter: [id number]      Assessment/Plan     Problem List Items Addressed This Visit        Endocrine    Hypothyroidism     Continue levothyroxine at current dose         Relevant Orders    Comprehensive metabolic panel Lab Collect    Type 2 diabetes mellitus with ophthalmic complication, with long-term current use of insulin ([facility] [address] ) - Primary       Lab Results   Component Value Date    HGBA1C 7 6 10/21/2019   Sugars have been high recently-continue current regimen and watch diet         Relevant Orders    Comprehensive metabolic panel Lab Collect    HEMOGLOBIN A1C W/ EAG ESTIMATION Lab Collect    Microalbumin / creatinine urine ratio Lab Collect       Cardiovascular and Mediastinum    Hypertension     Blood pressure at goal-continue current regimen         Relevant Orders    Comprehensive metabolic panel Lab Collect       Other    Hyperlipidemia     Continue statins         Relevant Orders    Comprehensive metabolic panel Lab Collect    Lipid Panel with Direct LDL reflex Lab Collect        CC: Diabetes    History of Present Illness     HPI:  80-year-old male with  type 2 diabetes on long-term insulin therapy, complicated by retinopathy and chronic kidney disease here for follow-up, he also has hypertension, hyperlipidemia and hypothyroidism   had chemo and RT and optivo - will be getting imaging in [facility] 60 units at bedtime   humalog 18-18-45-10    Checks f s 4/day - fasting 140-180s  Before 150-200s  Bedtime 150-250s  Rare hypoglycemia   C/o polyuria , no blurry vision or numbness and tinglin in feet     Review of Systems   Constitutional: Negative for fatigue and unexpected weight change  Eyes: Negative for visual disturbance  Respiratory: Negative for shortness of breath  Cardiovascular: Negative for palpitations and leg swelling  Gastrointestinal: Negative for constipation, diarrhea, nausea and vomiting  Endocrine: Positive for polyuria  Negative for polydipsia  Musculoskeletal: Positive for arthralgias and gait problem  Skin: Negative for pallor, rash and wound  Neurological: Negative for weakness  Psychiatric/Behavioral: Positive for sleep disturbance  Negative for confusion  All other systems reviewed and are negative        Historical Information   Past Medical History:   Diagnosis Date    Aortic stenosis     Benign hypertension     Chronic diastolic heart failure (HCC)     Colon polyps     Diabetes (HCC)     Diverticulitis     GERD (gastroesophageal reflux disease)     Hypertension     Hypothyroidism     Obstructive sleep apnea syndrome     Prostatism     Sleep apnea     Stomach ulcer     Stroke ([facility] [address]     Thyroid disease     Vitamin D deficiency      Past Surgical History:   Procedure Laterality Date    CATARACT EXTRACTION      REPLACEMENT TOTAL KNEE      SINUS SURGERY      with deviated septum     Social History   Social History     Substance and Sexual Activity   Alcohol Use No     Social History     Substance and Sexual Activity   Drug Use No     Social History     Tobacco Use   Smoking Status Former Smoker    Types: Cigarettes    Last attempt to quit: [place] Years since quittin 8   Smokeless Tobacco Never Used     Family History:   Family History   Problem Relation Age of Onset    Diabetes type II Maternal Grandfather     Diabetes type II Paternal Grandfather     Coronary artery disease Father     Kidney disease Sister     Ovarian cancer Maternal Grandmother        Meds/Allergies   Current Outpatient Medications   Medication Sig Dispense Refill    atorvastatin (LIPITOR) 40 mg tablet Take 1 tablet (40 mg total) by mouth daily 90 tablet 3    ferrous sulfate 325 (65 Fe) mg tablet Take 1 tablet (325 mg total) by mouth 2 (two) times a day with meals 180 tablet 3    furosemide (LASIX) 80 mg tablet TAKE 1 TABLET BY MOUTH TWO  TIMES DAILY 180 tablet 3    glucose blood (FREESTYLE LITE) test strip Use 4/day DX E11 9, insulin treated 400 each 3    insulin lispro (HUMALOG KWIKPEN) 100 units/mL injection pen INJECT SUBCUTANEOUSLY 14  UNITS BEFORE BREAKFAST, 12  UNITS BEFORE LUNCH, 45  UNITS BEFORE SUPPER, AND 20 UNITS BEFORE AT BEDTIME (Patient taking differently: INJECT SUBCUTANEOUSLY 18  UNITS BEFORE BREAKFAST, 18 UNITS BEFORE LUNCH, 45  UNITS BEFORE SUPPER, AND 10 UNITS BEFORE AT BEDTIME) 32 pen 3    Insulin Pen Needle (B-D UF III MINI PEN NEEDLES) 31G X 5 MM MISC by Does not apply route 5 (five) times a day for 90 days 500 each 3    levothyroxine 88 mcg tablet Take 1 tablet (88 mcg total) by mouth daily 90 tablet 3    lisinopril (ZESTRIL) 20 mg tablet Take 1 tablet (20 mg total) by mouth daily 90 tablet 3    metolazone (ZAROXOLYN) 2 5 mg tablet TAKE 1 TABLET BY MOUTH  EVERY DAY 90 tablet 1    metoprolol succinate (TOPROL-XL) 25 mg 24 hr tablet Take 1 tablet (25 mg total) by mouth daily 90 tablet 3    Multiple Vitamins-Minerals (EYE VITAMINS) CAPS Take 1 capsule by mouth daily      Multiple Vitamins-Minerals (MULTIVITAMIN ADULT PO) Take 1 tablet by mouth daily      pantoprazole (PROTONIX) 40 mg tablet Take 1 tablet (40 mg total) by mouth daily 90 tablet 3    tamsulosin (FLOMAX) 0 4 mg Take 1 capsule (0 4 mg total) by mouth daily with dinner 90 capsule 2    TOUJEO SOLOSTAR 300 units/mL CONCETRATED U-300 injection pen Inject 60 Units under the skin daily for 180 days Inject 60 units daily as directed 12 pen 1     No current facility-administered medications for this visit  Allergies   Allergen Reactions    No Known Allergies        Objective   Vitals: Blood pressure 126/50, pulse 67, height 5' 9" (1 753 m), weight 111 kg (244 lb 12 8 oz)  Physical Exam   Constitutional: He is oriented to person, place, and time  He appears well-developed and well-nourished  No distress  HENT:   Head: Normocephalic and atraumatic  Eyes: EOM are normal  No scleral icterus  Neck: Normal range of motion   Neck supple  No thyromegaly present  Cardiovascular: Normal rate, regular rhythm and normal heart sounds  Pulmonary/Chest: Effort normal and breath sounds normal  No respiratory distress  He has no wheezes  He has no rales  Abdominal: Soft  Bowel sounds are normal  He exhibits no distension  There is no tenderness  There is no guarding  Musculoskeletal: Normal range of motion  He exhibits no deformity  Ankle edema bilateral lower extremities   Lymphadenopathy:     He has no cervical adenopathy  Neurological: He is alert and oriented to person, place, and time  Skin: Skin is warm and dry  Psychiatric: He has a normal mood and affect  His behavior is normal  Judgment and thought content normal        The history was obtained from the review of the chart, patient  Lab Results:   Lab Results   Component Value Date/Time    Hemoglobin A1C 7 6 10/21/2019    Hemoglobin A1C 7 2 06/11/2019    Hemoglobin A1C 6 8 03/06/2019    White Blood Cell Count 5 9 11/20/2018 07:25 AM    Hemoglobin 13 8 11/20/2018 07:25 AM    HCT 40 8 11/20/2018 07:25 AM    MCV 89 5 11/20/2018 07:25 AM    Platelet Count 651 26/08/9824 07:25 AM    BUN 45 (H) 11/20/2018 07:25 AM    Potassium 4 9 11/20/2018 07:25 AM    Chloride 104 11/20/2018 07:25 AM    CO2 36 (H) 11/20/2018 07:25 AM    Creatinine 1 31 (H) 11/20/2018 07:25 AM    AST 18 11/20/2018 07:25 AM    ALT 15 11/20/2018 07:25 AM    Albumin 3 9 11/20/2018 07:25 AM    Globulin 2 5 11/20/2018 07:25 AM           Portions of the record may have been created with voice recognition software  Occasional wrong word or "sound a like" substitutions may have occurred due to the inherent limitations of voice recognition software  Read the chart carefully and recognize, using context, where substitutions have occurred

## 2019-10-31 ENCOUNTER — TELEPHONE (OUTPATIENT)
Dept: ENDOCRINOLOGY | Facility: CLINIC | Age: 79
End: 2019-10-31

## 2019-11-08 ENCOUNTER — OFFICE VISIT (OUTPATIENT)
Dept: FAMILY MEDICINE CLINIC | Facility: CLINIC | Age: 79
End: 2019-11-08
Payer: MEDICARE

## 2019-11-08 VITALS
RESPIRATION RATE: 17 BRPM | WEIGHT: 243 LBS | DIASTOLIC BLOOD PRESSURE: 62 MMHG | BODY MASS INDEX: 35.99 KG/M2 | HEIGHT: 69 IN | TEMPERATURE: 97.6 F | HEART RATE: 60 BPM | OXYGEN SATURATION: 93 % | SYSTOLIC BLOOD PRESSURE: 122 MMHG

## 2019-11-08 DIAGNOSIS — E11.39 TYPE 2 DIABETES MELLITUS WITH OTHER OPHTHALMIC COMPLICATION, WITH LONG-TERM CURRENT USE OF INSULIN (HCC): Primary | ICD-10-CM

## 2019-11-08 DIAGNOSIS — I50.32 CHRONIC HEART FAILURE WITH PRESERVED EJECTION FRACTION (HCC): ICD-10-CM

## 2019-11-08 DIAGNOSIS — Z23 ENCOUNTER FOR IMMUNIZATION: ICD-10-CM

## 2019-11-08 DIAGNOSIS — M54.18 RIGHT SACRAL RADICULOPATHY: ICD-10-CM

## 2019-11-08 DIAGNOSIS — Z79.4 TYPE 2 DIABETES MELLITUS WITH OTHER OPHTHALMIC COMPLICATION, WITH LONG-TERM CURRENT USE OF INSULIN (HCC): Primary | ICD-10-CM

## 2019-11-08 DIAGNOSIS — I35.0 AORTIC STENOSIS, MODERATE: ICD-10-CM

## 2019-11-08 DIAGNOSIS — E78.2 MIXED HYPERLIPIDEMIA: ICD-10-CM

## 2019-11-08 PROCEDURE — 90662 IIV NO PRSV INCREASED AG IM: CPT

## 2019-11-08 PROCEDURE — 99214 OFFICE O/P EST MOD 30 MIN: CPT | Performed by: INTERNAL MEDICINE

## 2019-11-08 PROCEDURE — G0008 ADMIN INFLUENZA VIRUS VAC: HCPCS

## 2019-11-08 NOTE — PROGRESS NOTES
Assessment/Plan:   Patient has had a dramatic improvement in his liver cancer on the immunotherapy that he has finished  On the CT scan it appears that he has had a 30% reduction in the size of the liver lesion  The lung nodules which may be independent of the liver have remained approximately the same size  He is due for another CT scan in 3 months  Heart failure and cor pulmonale were stable on the present diuretics laboratory studies were reviewed  Renal function is excellent with a GFR 56  Diabetes is stable with a hemoglobin A1c of 7 6  Patient continues to wear oxygen at 3 liters/minute and BiPAP at night  Patient complains of moderate intermittent right sciatic pain in an S1 distribution  He is agreeable to get an x-ray of his lumbosacral spine  He is not agreeable to have an MRI done at this time  We will continue with conservative measures in treating this  Diet reviewed  Lifestyle modifications reviewed  Medications reviewed and ordered  Laboratory tests and studies reviewed and ordered  All patient's questions answered to patient satisfaction  Chief Complaint   Patient presents with    Diabetes     Labs in Pts chart    Cholangiocarcinoma         Diagnoses and all orders for this visit:    Type 2 diabetes mellitus with other ophthalmic complication, with long-term current use of insulin ([facility] [address] )  -     Microalbumin / creatinine urine ratio    Aortic stenosis, moderate    Mixed hyperlipidemia    Chronic heart failure with preserved ejection fraction (HCC)  -     CBC and differential; Future  -     Comprehensive metabolic panel; Future    Encounter for immunization  -     influenza vaccine, high-dose, PF 0 5 mL    Right sacral radiculopathy  -     XR spine lumbar minimum 4 views non injury; Future        Subjective: This 76-year-old male is seen for the following:    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    Recent CT scan demonstrates at least a 30% reduction in size of the tumor and the patient has had a dramatic response  He has not suffered any adverse effects from his treatment  He is to get another CT scan in approximately 3 months  Insulin-dependent type 2 diabetes mellitus with recent hemoglobin A1c of 7 6  Obstructive and central sleep apnea on BiPAP and 3 L of oxygen throughout the day and night  Cor pulmonale and left heart failure controlled with diuretics    Mild-to-moderate renal dysfunction with a recent GFR of 53 much improved  Patient ID: Ruth Abreu is a 67 y o  male          Current Outpatient Medications:     atorvastatin (LIPITOR) 40 mg tablet, Take 1 tablet (40 mg total) by mouth daily, Disp: 90 tablet, Rfl: 3    ferrous sulfate 325 (65 Fe) mg tablet, Take 1 tablet (325 mg total) by mouth 2 (two) times a day with meals, Disp: 180 tablet, Rfl: 3    furosemide (LASIX) 80 mg tablet, TAKE 1 TABLET BY MOUTH TWO  TIMES DAILY, Disp: 180 tablet, Rfl: 3    glucose blood (FREESTYLE LITE) test strip, Use 4/day DX E11 9, insulin treated, Disp: 400 each, Rfl: 3    insulin lispro (HUMALOG KWIKPEN) 100 units/mL injection pen, INJECT SUBCUTANEOUSLY 14  UNITS BEFORE BREAKFAST, 12  UNITS BEFORE LUNCH, 45  UNITS BEFORE SUPPER, AND 20 UNITS BEFORE AT BEDTIME (Patient taking differently: INJECT SUBCUTANEOUSLY 18  UNITS BEFORE BREAKFAST, 18 UNITS BEFORE LUNCH, 45  UNITS BEFORE SUPPER, AND 10 UNITS BEFORE AT BEDTIME), Disp: 32 pen, Rfl: 3    levothyroxine 88 mcg tablet, Take 1 tablet (88 mcg total) by mouth daily, Disp: 90 tablet, Rfl: 3    lisinopril (ZESTRIL) 20 mg tablet, Take 1 tablet (20 mg total) by mouth daily, Disp: 90 tablet, Rfl: 3    metolazone (ZAROXOLYN) 2 5 mg tablet, TAKE 1 TABLET BY MOUTH  EVERY DAY, Disp: 90 tablet, Rfl: 1    metoprolol succinate (TOPROL-XL) 25 mg 24 hr tablet, Take 1 tablet (25 mg total) by mouth daily, Disp: 90 tablet, Rfl: 3    Multiple Vitamins-Minerals (EYE VITAMINS) CAPS, Take 1 capsule by mouth daily, Disp: , Rfl:     Multiple Vitamins-Minerals (MULTIVITAMIN ADULT PO), Take 1 tablet by mouth daily, Disp: , Rfl:     pantoprazole (PROTONIX) 40 mg tablet, Take 1 tablet (40 mg total) by mouth daily, Disp: 90 tablet, Rfl: 3    tamsulosin (FLOMAX) 0 4 mg, Take 1 capsule (0 4 mg total) by mouth daily with dinner, Disp: 90 capsule, Rfl: 2    TOUJEO SOLOSTAR 300 units/mL CONCETRATED U-300 injection pen, Inject 60 Units under the skin daily for 180 days Inject 60 units daily as directed, Disp: 12 pen, Rfl: 1    Insulin Pen Needle (B-D UF III MINI PEN NEEDLES) 31G X 5 MM MISC, by Does not apply route 5 (five) times a day for 90 days, Disp: 500 each, Rfl: 3    The following portions of the patient's history were reviewed and updated as appropriate: allergies, current medications, past family history, past medical history, past social history, past surgical history and problem list     Review of Systems   Constitutional: Negative for appetite change, fatigue, fever and unexpected weight change  HENT: Negative for rhinorrhea, sinus pressure, sinus pain, sneezing and sore throat  Eyes: Negative for visual disturbance  Respiratory: Negative for cough, chest tightness, shortness of breath and wheezing  Cardiovascular: Negative for chest pain, palpitations and leg swelling  Gastrointestinal: Negative for abdominal distention, abdominal pain, blood in stool, constipation, diarrhea, nausea and vomiting  Endocrine: Negative for polydipsia and polyuria  Genitourinary: Negative for decreased urine volume, difficulty urinating, dysuria, hematuria and urgency  Musculoskeletal: Negative for arthralgias, back pain, joint swelling and neck pain  Skin: Negative for rash  Allergic/Immunologic: Negative for environmental allergies  Neurological: Negative for tremors, weakness, light-headedness, numbness and headaches  Hematological: Does not bruise/bleed easily     Psychiatric/Behavioral: Negative for agitation, behavioral problems, confusion and dysphoric mood  The patient is not nervous/anxious            Family History   Problem Relation Age of Onset    Diabetes type II Maternal Grandfather     Diabetes type II Paternal Grandfather     Coronary artery disease Father     Kidney disease Sister     Ovarian cancer Maternal Grandmother        Past Medical History:   Diagnosis Date    Aortic stenosis     Benign hypertension     Chronic diastolic heart failure (HCC)     Colon polyps     Diabetes (HCC)     Diverticulitis     GERD (gastroesophageal reflux disease)     Hypertension     Hypothyroidism     Obstructive sleep apnea syndrome     Prostatism     Sleep apnea     Stomach ulcer     Stroke ([facility] [address] )     Thyroid disease     Vitamin D deficiency        Past Surgical History:   Procedure Laterality Date    CATARACT EXTRACTION      REPLACEMENT TOTAL KNEE      SINUS SURGERY      with deviated septum       Social History     Socioeconomic History    Marital status: /Civil Union     Spouse name: None    Number of children: None    Years of education: None    Highest education level: None   Occupational History    Occupation: retired   Social Needs    Financial resource strain: None    Food insecurity:     Worry: None     Inability: None    Transportation needs:     Medical: None     Non-medical: None   Tobacco Use    Smoking status: Former Smoker     Types: Cigarettes     Last attempt to quit:      Years since quittin 8    Smokeless tobacco: Never Used   Substance and Sexual Activity    Alcohol use: No    Drug use: No    Sexual activity: Not Currently     Partners: Female     Birth control/protection: Abstinence   Lifestyle    Physical activity:     Days per week: None     Minutes per session: None    Stress: None   Relationships    Social connections:     Talks on phone: None     Gets together: None     Attends Nondenominational service: None     Active member of club or organization: None     Attends meetings of clubs or organizations: None     Relationship status: None    Intimate partner violence:     Fear of current or ex partner: None     Emotionally abused: None     Physically abused: None     Forced sexual activity: None   Other Topics Concern    None   Social History Narrative    Pt states he has 1 cup of tea daily       Allergies   Allergen Reactions    No Known Allergies             Objective:    /62   Pulse 60   Temp 97 6 °F (36 4 °C)   Resp 17   Ht 5' 9" (1 753 m)   Wt 110 kg (243 lb)   SpO2 93%   BMI 35 88 kg/m²        Physical Exam   Constitutional: He is oriented to person, place, and time  He appears well-developed and well-nourished  No distress  HENT:   Head: Normocephalic and atraumatic  Nose: Nose normal    Mouth/Throat: Oropharynx is clear and moist  No oropharyngeal exudate  Eyes: Pupils are equal, round, and reactive to light  Conjunctivae and EOM are normal  No scleral icterus  Neck: Normal range of motion  Neck supple  No JVD present  No tracheal deviation present  No thyromegaly present  Cardiovascular: Normal rate, regular rhythm and normal heart sounds  Exam reveals no gallop and no friction rub  No murmur heard  Pulmonary/Chest: Effort normal  No respiratory distress  He has no wheezes  He has no rales  He exhibits no tenderness  Abdominal: Soft  Bowel sounds are normal  He exhibits no distension and no mass  There is no tenderness  There is no rebound and no guarding  Musculoskeletal: Normal range of motion  He exhibits no edema or deformity  Lymphadenopathy:     He has no cervical adenopathy  Neurological: He is alert and oriented to person, place, and time  No cranial nerve deficit  Coordination normal    Skin: Skin is warm and dry  No rash noted  Psychiatric: He has a normal mood and affect   His behavior is normal  Judgment and thought content normal

## 2019-11-11 DIAGNOSIS — K21.9 GASTROESOPHAGEAL REFLUX DISEASE WITHOUT ESOPHAGITIS: ICD-10-CM

## 2019-11-11 DIAGNOSIS — E78.2 MIXED HYPERLIPIDEMIA: ICD-10-CM

## 2019-11-11 DIAGNOSIS — E03.8 OTHER SPECIFIED HYPOTHYROIDISM: ICD-10-CM

## 2019-11-11 DIAGNOSIS — I50.32 CHRONIC HEART FAILURE WITH PRESERVED EJECTION FRACTION (HCC): ICD-10-CM

## 2019-11-11 DIAGNOSIS — Q87.89: ICD-10-CM

## 2019-11-11 DIAGNOSIS — Q77.7: ICD-10-CM

## 2019-11-11 RX ORDER — METOPROLOL SUCCINATE 25 MG/1
TABLET, EXTENDED RELEASE ORAL
Qty: 90 TABLET | Refills: 3 | Status: SHIPPED | OUTPATIENT
Start: 2019-11-11 | End: 2020-10-16

## 2019-11-11 RX ORDER — METOLAZONE 2.5 MG/1
TABLET ORAL
Qty: 90 TABLET | Refills: 1 | Status: SHIPPED | OUTPATIENT
Start: 2019-11-11 | End: 2019-12-19 | Stop reason: SDUPTHER

## 2019-11-11 RX ORDER — ATORVASTATIN CALCIUM 40 MG/1
TABLET, FILM COATED ORAL
Qty: 90 TABLET | Refills: 3 | Status: SHIPPED | OUTPATIENT
Start: 2019-11-11

## 2019-11-11 RX ORDER — LEVOTHYROXINE SODIUM 88 UG/1
TABLET ORAL
Qty: 90 TABLET | Refills: 3 | Status: SHIPPED | OUTPATIENT
Start: 2019-11-11 | End: 2020-10-16

## 2019-11-11 RX ORDER — LISINOPRIL 20 MG/1
TABLET ORAL
Qty: 90 TABLET | Refills: 3 | Status: SHIPPED | OUTPATIENT
Start: 2019-11-11 | End: 2020-10-16

## 2019-11-11 RX ORDER — PANTOPRAZOLE SODIUM 40 MG/1
TABLET, DELAYED RELEASE ORAL
Qty: 90 TABLET | Refills: 3 | Status: SHIPPED | OUTPATIENT
Start: 2019-11-11

## 2019-11-11 RX ORDER — FUROSEMIDE 80 MG
TABLET ORAL
Qty: 180 TABLET | Refills: 3 | Status: SHIPPED | OUTPATIENT
Start: 2019-11-11 | End: 2020-10-16

## 2019-11-12 ENCOUNTER — TELEPHONE (OUTPATIENT)
Dept: ENDOCRINOLOGY | Facility: CLINIC | Age: 79
End: 2019-11-12

## 2019-11-12 NOTE — TELEPHONE ENCOUNTER
Blood sugars high overall past week  Increase toujeo by 4 units  Increase all doses of Humalog by 2 units (but keep scale the same)  Send log in a week

## 2019-11-13 RX ORDER — INSULIN LISPRO 100 [IU]/ML
INJECTION, SOLUTION INTRAVENOUS; SUBCUTANEOUS
COMMUNITY
End: 2020-03-09

## 2019-11-13 NOTE — TELEPHONE ENCOUNTER
L/m for patient with instructions  Told to call back if any questions  Updated med list to reflect dose changes

## 2019-11-26 ENCOUNTER — TELEPHONE (OUTPATIENT)
Dept: ENDOCRINOLOGY | Facility: CLINIC | Age: 79
End: 2019-11-26

## 2019-12-02 NOTE — TELEPHONE ENCOUNTER
L/m for patient with instructions  Asked to call back if any questions or if refills are needed  Updated med list to reflect changes

## 2019-12-02 NOTE — TELEPHONE ENCOUNTER
Patient refuses to see nutritionist  He stated that his diet hasn't changed and he is even skipping lunch sometimes  He is really frustrated and stated that seeing a nutritionist is not the answer

## 2019-12-02 NOTE — TELEPHONE ENCOUNTER
Increase tresiba to 70 units and novolog to 25 units before breakfast and lunch - continue the rest and send over log in 2 weeks

## 2019-12-03 DIAGNOSIS — E11.39 TYPE 2 DIABETES MELLITUS WITH OTHER OPHTHALMIC COMPLICATION, WITH LONG-TERM CURRENT USE OF INSULIN (HCC): ICD-10-CM

## 2019-12-03 DIAGNOSIS — Z79.4 TYPE 2 DIABETES MELLITUS WITH OTHER OPHTHALMIC COMPLICATION, WITH LONG-TERM CURRENT USE OF INSULIN (HCC): ICD-10-CM

## 2019-12-03 RX ORDER — PEN NEEDLE, DIABETIC 31 GX5/16"
NEEDLE, DISPOSABLE MISCELLANEOUS
Qty: 450 EACH | Refills: 3 | Status: SHIPPED | OUTPATIENT
Start: 2019-12-03

## 2019-12-19 DIAGNOSIS — E11.39 TYPE 2 DIABETES MELLITUS WITH OTHER OPHTHALMIC COMPLICATION, WITH LONG-TERM CURRENT USE OF INSULIN (HCC): Primary | ICD-10-CM

## 2019-12-19 DIAGNOSIS — I50.32 CHRONIC HEART FAILURE WITH PRESERVED EJECTION FRACTION (HCC): ICD-10-CM

## 2019-12-19 DIAGNOSIS — Z79.4 TYPE 2 DIABETES MELLITUS WITH OTHER OPHTHALMIC COMPLICATION, WITH LONG-TERM CURRENT USE OF INSULIN (HCC): Primary | ICD-10-CM

## 2019-12-19 RX ORDER — METOLAZONE 2.5 MG/1
2.5 TABLET ORAL DAILY
Qty: 90 TABLET | Refills: 1 | Status: SHIPPED | OUTPATIENT
Start: 2019-12-19

## 2020-01-03 ENCOUNTER — OFFICE VISIT (OUTPATIENT)
Dept: FAMILY MEDICINE CLINIC | Facility: CLINIC | Age: 80
End: 2020-01-03
Payer: MEDICARE

## 2020-01-03 ENCOUNTER — TELEPHONE (OUTPATIENT)
Dept: ENDOCRINOLOGY | Facility: CLINIC | Age: 80
End: 2020-01-03

## 2020-01-03 VITALS
OXYGEN SATURATION: 89 % | BODY MASS INDEX: 35.07 KG/M2 | RESPIRATION RATE: 18 BRPM | HEART RATE: 50 BPM | TEMPERATURE: 99.1 F | HEIGHT: 69 IN | WEIGHT: 236.8 LBS | SYSTOLIC BLOOD PRESSURE: 140 MMHG | DIASTOLIC BLOOD PRESSURE: 60 MMHG

## 2020-01-03 DIAGNOSIS — N18.2 STAGE 2 CHRONIC KIDNEY DISEASE: ICD-10-CM

## 2020-01-03 DIAGNOSIS — Z79.4 TYPE 2 DIABETES MELLITUS WITH RIGHT EYE AFFECTED BY RETINOPATHY AND MACULAR EDEMA, WITH LONG-TERM CURRENT USE OF INSULIN, UNSPECIFIED RETINOPATHY SEVERITY (HCC): ICD-10-CM

## 2020-01-03 DIAGNOSIS — E11.311 TYPE 2 DIABETES MELLITUS WITH RIGHT EYE AFFECTED BY RETINOPATHY AND MACULAR EDEMA, WITH LONG-TERM CURRENT USE OF INSULIN, UNSPECIFIED RETINOPATHY SEVERITY (HCC): ICD-10-CM

## 2020-01-03 DIAGNOSIS — E66.01 CLASS 2 SEVERE OBESITY DUE TO EXCESS CALORIES WITH SERIOUS COMORBIDITY AND BODY MASS INDEX (BMI) OF 35.0 TO 35.9 IN ADULT (HCC): ICD-10-CM

## 2020-01-03 DIAGNOSIS — J96.11 CHRONIC RESPIRATORY FAILURE WITH HYPOXIA AND HYPERCAPNIA (HCC): ICD-10-CM

## 2020-01-03 DIAGNOSIS — J96.12 CHRONIC RESPIRATORY FAILURE WITH HYPOXIA AND HYPERCAPNIA (HCC): ICD-10-CM

## 2020-01-03 DIAGNOSIS — I35.0 AORTIC STENOSIS, MODERATE: ICD-10-CM

## 2020-01-03 DIAGNOSIS — I50.32 CHRONIC HEART FAILURE WITH PRESERVED EJECTION FRACTION (HCC): ICD-10-CM

## 2020-01-03 DIAGNOSIS — Z51.89 CONVALESCENCE FOLLOWING CHEMOTHERAPY: ICD-10-CM

## 2020-01-03 DIAGNOSIS — C22.1 CHOLANGIOCARCINOMA (HCC): Primary | ICD-10-CM

## 2020-01-03 DIAGNOSIS — G47.33 OBSTRUCTIVE SLEEP APNEA SYNDROME: ICD-10-CM

## 2020-01-03 PROCEDURE — 99214 OFFICE O/P EST MOD 30 MIN: CPT | Performed by: INTERNAL MEDICINE

## 2020-01-03 NOTE — PROGRESS NOTES
Assessment/Plan:  Patient is noted approximately 6 lb weight loss and a decrease in appetite has been noted by his family  Patient is concerned about this  Patient sugars have been running higher in the late a m  and fasting  He has increased his to [facility] slightly at the direction of the endocrinology team   He is due to have repeat scan CT scanning later this month  He will turn age 79 in February  He has not had laboratory work done since October and declines laboratory work at this time and will get his usual laboratory work done before his 3 month visit with Oncology  I will review the labs at that time  Edema remains well controlled and is +1 pretibially  He is on 80 mg of Lasix and metolazone 2 5 mg daily  Diet reviewed  Lifestyle modifications reviewed  Medications reviewed and ordered  Laboratory tests and studies reviewed and ordered  All patient's questions answered to patient satisfaction  Diagnoses and all orders for this visit:    Cholangiocarcinoma [facility])    Convalescence following chemotherapy    Class 2 severe obesity due to excess calories with serious comorbidity and body mass index (BMI) of 35 0 to 35 9 in adult [facility])    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, with long-term current use of insulin, unspecified retinopathy severity ([address] )    Obstructive sleep apnea syndrome    Chronic respiratory failure with hypoxia and hypercapnia (HCC)    Chronic heart failure with preserved ejection fraction (HCC)    Aortic stenosis, moderate    Stage 2 chronic kidney disease        Chief Complaint   Patient presents with    Diabetes         Subjective: This 29-year-old male who is soon to become 79 in February is seen for the following:    Cholangiocarcinoma with liver mass on immunotherapy and awaiting repeat CT scan later this month and repeat laboratory studies also later this month    He has lost approximately 6 lb and complains that his family noticed that his appetite is diminished  This is highly unusual for this patient who suffered from morbid obesity and obstructive and central sleep apnea and is oxygen dependent  His BMI is just below the 35 [place] and has not been this low for several years  He may even be able to use less CPAP and oxygen if he gets down to the 225 range which is 11 lb less  His edema is only 1+ bilaterally and appears to be better and more easily managed  He does complain that her sugars are higher and can figure that out because he says he is not eating more  The endocrinology team is could is directing his treatment  Patient ID: Do Holley is a 67 y o  male          Current Outpatient Medications:     atorvastatin (LIPITOR) 40 mg tablet, TAKE 1 TABLET BY MOUTH  DAILY, Disp: 90 tablet, Rfl: 3    B-D UF III MINI PEN NEEDLES 31G X 5 MM MISC, USE 5 TIMES A DAY, Disp: 450 each, Rfl: 3    ferrous sulfate 325 (65 Fe) mg tablet, Take 1 tablet (325 mg total) by mouth 2 (two) times a day with meals, Disp: 180 tablet, Rfl: 3    furosemide (LASIX) 80 mg tablet, TAKE 1 TABLET BY MOUTH TWO  TIMES DAILY, Disp: 180 tablet, Rfl: 3    glucose blood (FREESTYLE LITE) test strip, Use 4/day DX E11 9, insulin treated, Disp: 400 each, Rfl: 3    insulin glargine (TOUJEO) 300 units/mL CONCETRATED U-300 injection pen (1-unit dial), Inject 70 Units under the skin daily, Disp: 25 mL, Rfl: 1    insulin lispro (HumaLOG) 100 units/mL injection pen, Inject 25 units before breakfast, 25 units before lunch, 47 units before dinner & 12 units before bed, Disp: , Rfl:     levothyroxine 88 mcg tablet, TAKE 1 TABLET BY MOUTH  DAILY, Disp: 90 tablet, Rfl: 3    lisinopril (ZESTRIL) 20 mg tablet, TAKE 1 TABLET BY MOUTH  DAILY, Disp: 90 tablet, Rfl: 3    metolazone (ZAROXOLYN) 2 5 mg tablet, Take 1 tablet (2 5 mg total) by mouth daily, Disp: 90 tablet, Rfl: 1    metoprolol succinate (TOPROL-XL) 25 mg 24 hr tablet, TAKE 1 TABLET BY MOUTH  DAILY, Disp: 90 tablet, Rfl: 3    Multiple Vitamins-Minerals (EYE VITAMINS) CAPS, Take 1 capsule by mouth daily, Disp: , Rfl:     Multiple Vitamins-Minerals (MULTIVITAMIN ADULT PO), Take 1 tablet by mouth daily, Disp: , Rfl:     pantoprazole (PROTONIX) 40 mg tablet, TAKE 1 TABLET BY MOUTH  DAILY, Disp: 90 tablet, Rfl: 3    tamsulosin (FLOMAX) 0 4 mg, Take 1 capsule (0 4 mg total) by mouth daily with dinner, Disp: 90 capsule, Rfl: 2    The following portions of the patient's history were reviewed and updated as appropriate: allergies, current medications, past family history, past medical history, past social history, past surgical history and problem list     Review of Systems   Constitutional: Positive for appetite change and unexpected weight change           Family History   Problem Relation Age of Onset    Diabetes type II Maternal Grandfather     Diabetes type II Paternal Grandfather     Coronary artery disease Father     Kidney disease Sister     Ovarian cancer Maternal Grandmother        Past Medical History:   Diagnosis Date    Aortic stenosis     Benign hypertension     Chronic diastolic heart failure (HCC)     Colon polyps     Diabetes (HCC)     Diverticulitis     GERD (gastroesophageal reflux disease)     Hypertension     Hypothyroidism     Obstructive sleep apnea syndrome     Prostatism     Sleep apnea     Stomach ulcer     Stroke ([facility] [address] )     Thyroid disease     Vitamin D deficiency        Past Surgical History:   Procedure Laterality Date    CATARACT EXTRACTION      REPLACEMENT TOTAL KNEE      SINUS SURGERY      with deviated septum       Social History     Socioeconomic History    Marital status: /Civil Union     Spouse name: None    Number of children: None    Years of education: None    Highest education level: None   Occupational History    Occupation: retired   Social Needs    Financial resource strain: None    Food insecurity:     Worry: None     Inability: None   Roddy Riddle Transportation needs:     Medical: None     Non-medical: None   Tobacco Use    Smoking status: Former Smoker     Types: Cigarettes     Last attempt to quit: 1981     Years since quittin 0    Smokeless tobacco: Never Used   Substance and Sexual Activity    Alcohol use: No    Drug use: No    Sexual activity: Not Currently     Partners: Female     Birth control/protection: Abstinence   Lifestyle    Physical activity:     Days per week: None     Minutes per session: None    Stress: None   Relationships    Social connections:     Talks on phone: None     Gets together: None     Attends Rastafari service: None     Active member of club or organization: None     Attends meetings of clubs or organizations: None     Relationship status: None    Intimate partner violence:     Fear of current or ex partner: None     Emotionally abused: None     Physically abused: None     Forced sexual activity: None   Other Topics Concern    None   Social History Narrative    Pt states he has 1 cup of tea daily       Allergies   Allergen Reactions    No Known Allergies          Objective:    /60 (BP Location: Left arm, Patient Position: Sitting, Cuff Size: Adult)   Pulse (!) 50   Temp 99 1 °F (37 3 °C)   Resp 18   Ht 5' 9" (1 753 m)   Wt 107 kg (236 lb 12 8 oz)   SpO2 (!) 89%   BMI 34 97 kg/m²        Physical Exam   Constitutional: He is oriented to person, place, and time  He appears well-developed and well-nourished  No distress  HENT:   Head: Normocephalic and atraumatic  Nose: Nose normal    Mouth/Throat: Oropharynx is clear and moist  No oropharyngeal exudate  Eyes: Pupils are equal, round, and reactive to light  Conjunctivae and EOM are normal  No scleral icterus  Neck: Normal range of motion  Neck supple  No JVD present  No tracheal deviation present  No thyromegaly present  Cardiovascular: Normal rate, regular rhythm and normal heart sounds  Exam reveals no gallop and no friction rub     No murmur heard   Pulmonary/Chest: Effort normal  No respiratory distress  He has no wheezes  He has no rales  He exhibits no tenderness  Abdominal: Soft  Bowel sounds are normal  He exhibits no distension and no mass  There is no tenderness  There is no rebound and no guarding  Musculoskeletal: Normal range of motion  He exhibits edema (Plus one pretibial bilaterally)  He exhibits no deformity  Lymphadenopathy:     He has no cervical adenopathy  Neurological: He is alert and oriented to person, place, and time  No cranial nerve deficit  Coordination normal    Skin: Skin is warm and dry  No rash noted  Psychiatric: He has a normal mood and affect   His behavior is normal  Judgment and thought content normal

## 2020-01-10 NOTE — TELEPHONE ENCOUNTER
Blood sugars high overall, especially morning and prelunch  Increase toujeo by 5 units and increase breakfast humalog by 5 units  Send log in two weeks  I saw prior note that he doesn't want to meet with dietician  What is he eating/drinking for breakfast or midmorning snack? Has he been sick or on steroids?

## 2020-01-10 NOTE — TELEPHONE ENCOUNTER
Called and notified patient of changes  He refused to go over his diet with me and kept saying it's not affecting his sugars because nothing has changed  He says he was going to talk to his cancer doctor to see if the chemo meds are making his sugars go up  He is really frustrated  Updated med list to reflect changes

## 2020-01-16 ENCOUNTER — TELEPHONE (OUTPATIENT)
Dept: ENDOCRINOLOGY | Facility: CLINIC | Age: 80
End: 2020-01-16

## 2020-01-16 NOTE — TELEPHONE ENCOUNTER
Called and notified patient  He said the only change is that he's trying to eat a small salad with lunch  He also checked with the oncologist to see if the treatments had anything to do with his sugars and he said no  Updated med list to reflect med changes

## 2020-01-16 NOTE — TELEPHONE ENCOUNTER
Blood sugars Much lower-- has anything else changed other than the insulin dose? For now go back down to 70 units of toujeo send log in a week or sooner if continues to have lows

## 2020-01-27 ENCOUNTER — TELEPHONE (OUTPATIENT)
Dept: ENDOCRINOLOGY | Facility: CLINIC | Age: 80
End: 2020-01-27

## 2020-01-28 ENCOUNTER — TELEPHONE (OUTPATIENT)
Dept: ENDOCRINOLOGY | Facility: CLINIC | Age: 80
End: 2020-01-28

## 2020-01-29 NOTE — TELEPHONE ENCOUNTER
Left message for pt to con't on same dose, and will discuss at his 2/4/2020 appt with [facility]

## 2020-01-30 LAB
CREAT ?TM UR-SCNC: 56.5 UMOL/L
HBA1C MFR BLD HPLC: 8.1 %

## 2020-01-31 ENCOUNTER — TELEPHONE (OUTPATIENT)
Dept: ENDOCRINOLOGY | Facility: CLINIC | Age: 80
End: 2020-01-31

## 2020-01-31 NOTE — TELEPHONE ENCOUNTER
----- Message from Rigo Payne MD sent at 1/31/2020  2:39 PM EST -----  Labs to be discussed on upcoming visit

## 2020-02-10 ENCOUNTER — TELEPHONE (OUTPATIENT)
Dept: ENDOCRINOLOGY | Facility: CLINIC | Age: 80
End: 2020-02-10

## 2020-02-11 ENCOUNTER — TELEPHONE (OUTPATIENT)
Dept: ENDOCRINOLOGY | Facility: CLINIC | Age: 80
End: 2020-02-11

## 2020-02-12 NOTE — TELEPHONE ENCOUNTER
I spoke with patient by phone, He doesn't seem happy with any of my explanations either    He is frustrated about blood sugars fluctuating and higher than usual, discussed various reasons that his blood sugar may be going high-- diet, reduced activity, weight gain, illness, poor insulin absorption, not rotating sites, leaking of insulin, progression of disease, etc   Suggest seeing PCP to rule out any other illness causing the high blood sugars but he has talked to him in past and he just said eat 1 slice of toast instead of 2 and there was nothing he could do because he is seeing endocrinologist      He Doesn't understand how cereal or two tomás butter cookies can make his Blood sugar go so high  I suggested referral to diabetes educator or dietician but at this time he declines, thinks it would be a waste of time  Suggest diagnostic continuous glucose monitor so we can get a better idea of his blood sugar patterns- he declines  Discussed a few options with him and reasons why his blood sugars may be high    For now he will increase the Breakfast dose of insulin as suggested and continue to send BG readings to office  Offered that he can change his upcoming appt with me to Dr Jules Farfan to see if she can help him out a little better

## 2020-02-12 NOTE — TELEPHONE ENCOUNTER
Called and notified patient of changes  He was not happy  Transferred to [facility] to discuss further  Updated med list to reflect dose adjustments

## 2020-02-12 NOTE — TELEPHONE ENCOUNTER
Pre lunch radings are consistently very high  Increase breakfast dose of insulin from 30 to 35 send log 2 weeks

## 2020-02-24 ENCOUNTER — TELEPHONE (OUTPATIENT)
Dept: ENDOCRINOLOGY | Facility: CLINIC | Age: 80
End: 2020-02-24

## 2020-02-24 NOTE — TELEPHONE ENCOUNTER
The blood sugars remain high- especially before lunch time     He is coming for appt soon   meds were recently adjusted  For now continue same meds  Would suggest that he keep a food log for a few days and bring to upcoming visit with everything he eats and drinks with portions for a few days (approx 3 day log)

## 2020-03-02 ENCOUNTER — TELEPHONE (OUTPATIENT)
Dept: ADMINISTRATIVE | Facility: OTHER | Age: 80
End: 2020-03-02

## 2020-03-02 ENCOUNTER — OFFICE VISIT (OUTPATIENT)
Dept: ENDOCRINOLOGY | Facility: CLINIC | Age: 80
End: 2020-03-02
Payer: MEDICARE

## 2020-03-02 VITALS
HEIGHT: 69 IN | SYSTOLIC BLOOD PRESSURE: 102 MMHG | DIASTOLIC BLOOD PRESSURE: 40 MMHG | WEIGHT: 238 LBS | HEART RATE: 63 BPM | BODY MASS INDEX: 35.25 KG/M2

## 2020-03-02 DIAGNOSIS — E11.39 TYPE 2 DIABETES MELLITUS WITH OTHER OPHTHALMIC COMPLICATION, WITH LONG-TERM CURRENT USE OF INSULIN (HCC): Primary | ICD-10-CM

## 2020-03-02 DIAGNOSIS — E03.9 HYPOTHYROIDISM, UNSPECIFIED TYPE: ICD-10-CM

## 2020-03-02 DIAGNOSIS — I10 HYPERTENSION, UNSPECIFIED TYPE: ICD-10-CM

## 2020-03-02 DIAGNOSIS — Z79.4 TYPE 2 DIABETES MELLITUS WITH OTHER OPHTHALMIC COMPLICATION, WITH LONG-TERM CURRENT USE OF INSULIN (HCC): Primary | ICD-10-CM

## 2020-03-02 DIAGNOSIS — E78.5 HYPERLIPIDEMIA, UNSPECIFIED HYPERLIPIDEMIA TYPE: ICD-10-CM

## 2020-03-02 PROCEDURE — 99215 OFFICE O/P EST HI 40 MIN: CPT | Performed by: PHYSICIAN ASSISTANT

## 2020-03-02 PROCEDURE — 3078F DIAST BP <80 MM HG: CPT | Performed by: PHYSICIAN ASSISTANT

## 2020-03-02 PROCEDURE — 1160F RVW MEDS BY RX/DR IN RCRD: CPT | Performed by: PHYSICIAN ASSISTANT

## 2020-03-02 PROCEDURE — 4040F PNEUMOC VAC/ADMIN/RCVD: CPT | Performed by: PHYSICIAN ASSISTANT

## 2020-03-02 PROCEDURE — 3008F BODY MASS INDEX DOCD: CPT | Performed by: PHYSICIAN ASSISTANT

## 2020-03-02 PROCEDURE — 3052F HG A1C>EQUAL 8.0%<EQUAL 9.0%: CPT | Performed by: PHYSICIAN ASSISTANT

## 2020-03-02 PROCEDURE — 3074F SYST BP LT 130 MM HG: CPT | Performed by: PHYSICIAN ASSISTANT

## 2020-03-02 PROCEDURE — 3066F NEPHROPATHY DOC TX: CPT | Performed by: PHYSICIAN ASSISTANT

## 2020-03-02 PROCEDURE — 1036F TOBACCO NON-USER: CPT | Performed by: PHYSICIAN ASSISTANT

## 2020-03-02 NOTE — LETTER
Diabetic Eye Exam Form    Date Requested: 20  Patient: Aviva Ambrosio  Patient : 1940   Referring Provider: Teresita Harris MD    Dilated Retinal Exam, Optomap-Iris Exam, or Fundus Photography Done         Yes (Te-[place]ak one above)         No     Date of Diabetic Eye Exam ______________________________  Left Eye      Exam did show retinopathy    Exam did not show retinopathy         Mild       Moderate       None       Proliferative       Severe     Right Eye     Exam did show retinopathy    Exam did not show retinopathy         Mild       Moderate       None       Proliferative       Severe     Comments __________________________________________________________    Practice Providing Exam ______________________________________________    Exam Performed By (print name) _______________________________________      Provider Signature ___________________________________________________      These reports are needed for  compliance    Please fax this completed form and a copy of the Diabetic Eye Exam report to our office located at [place] [address] as soon as possible to [phone] leanne Ramirez: Phone [phone]    We thank you for your assistance in treating our mutual patient

## 2020-03-02 NOTE — PROGRESS NOTES
Established Patient Progress Note      Chief Complaint   Patient presents with    Diabetes Type 2    Hypothyroidism        History of Present Illness:   Karuna Kerr is a 79 y o  male with a history of type 2 diabetes with long term use of insulin since many years ago  Reports complications of CKD and retinopathy  Denies recent illness or hospitalizations  He did have 1 low sugar of 38 on Feb 19th  Denies any issues with his current regimen  home glucose monitoring: are performed regularly 4x per day  He is frustrated that blood sugars have been higher despite no change in diet or activity  He did bring food log and for breakfast he is often eating large bowl of corn flakes, Orange Juice, and Fruit cocktail from a can  Follows with [facility] oncology due to metastatic cholangiocarcinoma-   Overall he has been feeling well    Home blood glucose readings:   Before breakfast: mostly in the 100s, a few readings over 200  Before lunch: mostly high in the 200-300s  Before dinner: variable mostly in the 100s, some in the 200s with a low of 62  Bedtime: varies   Current regimen: Toujeo 70 units daily   Humalog 35-25-47 before meals and takes 12 units at bedtime if BG over 200  Last Eye Exam: UTD  Last Foot Exam: UTD    Has hypertension: Taking lisinopril, toprol, lasix  Has hyperlipidemia: Taking atorvastatin    Thyroid disorders: hypothyroidism- takes levothyroxine       Patient Active Problem List   Diagnosis    Aortic stenosis, moderate    BPH (benign prostatic hyperplasia)    (HFpEF) heart failure with preserved ejection fraction (HCC)    Chronic respiratory failure (HCC)    GERD (gastroesophageal reflux disease)    Hematochezia    Hyperlipidemia    Hypertension    Hypochromic microcytic anemia    Hypothyroidism    Obesity    Obstructive sleep apnea syndrome    Thrombocytopenia (HCC)    Type 2 diabetes mellitus with ophthalmic complication, with long-term current use of insulin ([place] [address] )    Venous insufficiency    Vitamin D deficiency    Stage 2 chronic kidney disease    Bronchiectasis without complication (HCC)    Centrilobular emphysema (HCC)    Multiple lung nodules on CT    Cholangiocarcinoma ([place] [address] )    Convalescence following chemotherapy      Past Medical History:   Diagnosis Date    Aortic stenosis     Benign hypertension     Chronic diastolic heart failure (HCC)     Colon polyps     Diabetes (HCC)     Diverticulitis     GERD (gastroesophageal reflux disease)     Hypertension     Hypothyroidism     Obstructive sleep apnea syndrome     Prostatism     Sleep apnea     Stomach ulcer     Stroke ([place] [address] )     Thyroid disease     Vitamin D deficiency       Past Surgical History:   Procedure Laterality Date    CATARACT EXTRACTION      IR PORT REMOVAL      REPLACEMENT TOTAL KNEE      SINUS SURGERY      with deviated septum      Family History   Problem Relation Age of Onset    Diabetes type II Maternal Grandfather     Diabetes type II Paternal Grandfather     Coronary artery disease Father     Kidney disease Sister     Ovarian cancer Maternal Grandmother      Social History     Tobacco Use    Smoking status: Former Smoker     Types: Cigarettes     Last attempt to quit:      Years since quittin 1    Smokeless tobacco: Never Used   Substance Use Topics    Alcohol use: No     No Known Allergies      Current Outpatient Medications:     atorvastatin (LIPITOR) 40 mg tablet, TAKE 1 TABLET BY MOUTH  DAILY, Disp: 90 tablet, Rfl: 3    B-D UF III MINI PEN NEEDLES 31G X 5 MM MISC, USE 5 TIMES A DAY, Disp: 450 each, Rfl: 3    ferrous sulfate 325 (65 Fe) mg tablet, Take 1 tablet (325 mg total) by mouth 2 (two) times a day with meals, Disp: 180 tablet, Rfl: 3    furosemide (LASIX) 80 mg tablet, TAKE 1 TABLET BY MOUTH TWO  TIMES DAILY, Disp: 180 tablet, Rfl: 3    glucose blood (FREESTYLE LITE) test strip, Use 4/day DX E11 9, insulin treated, Disp: 400 each, Rfl: 3    insulin glargine (TOUJEO) 300 units/mL CONCETRATED U-300 injection pen (1-unit dial), Inject 70 Units under the skin daily, Disp: , Rfl:     insulin lispro (HumaLOG) 100 units/mL injection pen, Inject 35 units breakfast, 25 units lunch, 47 units dinner & 12 units before bed, Disp: , Rfl:     levothyroxine 88 mcg tablet, TAKE 1 TABLET BY MOUTH  DAILY, Disp: 90 tablet, Rfl: 3    lisinopril (ZESTRIL) 20 mg tablet, TAKE 1 TABLET BY MOUTH  DAILY, Disp: 90 tablet, Rfl: 3    metolazone (ZAROXOLYN) 2 5 mg tablet, Take 1 tablet (2 5 mg total) by mouth daily, Disp: 90 tablet, Rfl: 1    metoprolol succinate (TOPROL-XL) 25 mg 24 hr tablet, TAKE 1 TABLET BY MOUTH  DAILY, Disp: 90 tablet, Rfl: 3    Multiple Vitamins-Minerals (EYE VITAMINS) CAPS, Take 1 capsule by mouth daily, Disp: , Rfl:     Multiple Vitamins-Minerals (MULTIVITAMIN ADULT PO), Take 1 tablet by mouth daily, Disp: , Rfl:     pantoprazole (PROTONIX) 40 mg tablet, TAKE 1 TABLET BY MOUTH  DAILY (Patient taking differently: Take 40 mg by mouth 2 (two) times a day ), Disp: 90 tablet, Rfl: 3    tamsulosin (FLOMAX) 0 4 mg, Take 1 capsule (0 4 mg total) by mouth daily with dinner, Disp: 90 capsule, Rfl: 2    Review of Systems   Constitutional: Negative for activity change, appetite change and fatigue  HENT: Negative for sore throat, trouble swallowing and voice change  Eyes: Negative for visual disturbance  Respiratory: Negative for choking, chest tightness and shortness of breath  Cardiovascular: Negative for chest pain, palpitations and leg swelling  Gastrointestinal: Negative for abdominal pain, constipation and diarrhea  Endocrine: Negative for cold intolerance, heat intolerance, polydipsia, polyphagia and polyuria  Genitourinary: Negative for frequency  Musculoskeletal: Negative for arthralgias and myalgias  Skin: Negative for rash  Neurological: Negative for dizziness and syncope  Hematological: Negative for adenopathy  Psychiatric/Behavioral: Negative for sleep disturbance  All other systems reviewed and are negative  Physical Exam:  Body mass index is 35 15 kg/m²  BP (!) 102/40 (BP Location: Left arm, Patient Position: Sitting, Cuff Size: Standard)   Pulse 63   Ht 5' 9" (1 753 m)   Wt 108 kg (238 lb)   BMI 35 15 kg/m²    Wt Readings from Last 3 Encounters:   03/02/20 108 kg (238 lb)   01/03/20 107 kg (236 lb 12 8 oz)   11/08/19 110 kg (243 lb)       Physical Exam   Constitutional: He is oriented to person, place, and time  He appears well-developed and well-nourished  No distress  HENT:   Head: Normocephalic and atraumatic  Mouth/Throat: Oropharynx is clear and moist    Eyes: Pupils are equal, round, and reactive to light  Conjunctivae and EOM are normal    Neck: Normal range of motion  Neck supple  No thyromegaly present  Cardiovascular: Normal rate, regular rhythm and normal heart sounds  No murmur heard  Pulmonary/Chest: Effort normal and breath sounds normal  No respiratory distress  He has no wheezes  He has no rales  Abdominal: Soft  Bowel sounds are normal  He exhibits no distension  There is no tenderness  Musculoskeletal: Normal range of motion  He exhibits no edema  Lymphadenopathy:     He has no cervical adenopathy  Neurological: He is alert and oriented to person, place, and time  Skin: Skin is warm and dry  Psychiatric: He has a normal mood and affect  Vitals reviewed        Labs:   Lab Results   Component Value Date    HGBA1C 8 1 01/30/2020    HGBA1C 7 6 10/21/2019    HGBA1C 7 2 06/11/2019     Lab Results   Component Value Date    CREATININE 1 31 (H) 11/20/2018    CREATININE 1 42 (H) 10/15/2018    CREATININE 1 25 (H) 09/10/2018    BUN 45 (H) 11/20/2018     01/23/2017    K 4 9 11/20/2018     11/20/2018    CO2 36 (H) 11/20/2018     No results found for: EGFR  Lab Results   Component Value Date    CHOL 104 (L) 05/10/2017    HDL 34 (L) 05/10/2017    TRIG 63 05/10/2017 Lab Results   Component Value Date    ALT 15 11/20/2018    AST 18 11/20/2018    ALKPHOS 94 11/20/2018    BILITOT 0 8 01/23/2017     Lab Results   Component Value Date    ONO6PQTMHBDG 1 75 05/10/2017    DTQ1ADILZATC 3 67 01/23/2017    SYT3QINPOVOY 2 74 02/26/2016     Lab Results   Component Value Date    FREET4 1 1 02/23/2018       Impression & Plan:    Problem List Items Addressed This Visit        Endocrine    Hypothyroidism     Continue Levothyroxine  Relevant Orders    TSH, 3rd generation    T4, free    Type 2 diabetes mellitus with ophthalmic complication, with long-term current use of insulin ([facility] ) - Primary     Poorly Controlled/worsening though less hyperglycemia since insulin adjusted on 2/12  He continues with significant hyperglycemia prior to lunch with blood sugars frequently into the 300s and also occasional hypoglycemia  Discussed importance of consistent carbohydrate intake to keep blood sugars stable and prevent severe highs/lows  Goal to avoid hypoglycemia/severe hyperglycemia  Reviewed food dietary with patient  He is often eating a large bowl of cereal for breakfast along with fruit and Juice  He does not want to see dietician or count carbs but he is willing to cut back portion of carbohydrate at breakfast-- he will try to stop drinking juice and if eating cereal, use a smaller bowl  Continue same medications and send BG log for review in two weeks  If he notices any more hypoglycemia with dietary change he should let us know right away  Relevant Orders    Hemoglobin A1C    Comprehensive metabolic panel       Cardiovascular and Mediastinum    Hypertension     Continue lisinopril and toprol  Other    Hyperlipidemia     Continue atorvastatin                  Orders Placed This Encounter   Procedures    Hemoglobin A1C     Standing Status:   Future     Standing Expiration Date:   3/2/2021    Comprehensive metabolic panel     This is a patient instruction: Patient fasting for 8 hours or longer recommended  Standing Status:   Future     Standing Expiration Date:   3/2/2021    TSH, 3rd generation     This is a patient instruction: This test is non-fasting  Please drink two glasses of water morning of bloodwork  Standing Status:   Future     Standing Expiration Date:   3/2/2021    T4, free     Standing Status:   Future     Standing Expiration Date:   3/2/2021       There are no Patient Instructions on file for this visit  Discussed with the patient and all questioned fully answered  He will call me if any problems arise  Follow-up appointment in 3 months       Counseled patient on diagnostic results, prognosis, risk and benefit of treatment options, instruction for management, importance of treatment compliance, Risk  factor reduction and impressions    Saadia Smith PA-C

## 2020-03-02 NOTE — LETTER
Diabetic Eye Exam Form    Date Requested: 20  Patient: Yash Shah  Patient : 1940   Referring Provider: William Libman, MD    Dilated Retinal Exam, Optomap-Iris Exam, or Fundus Photography Done         Yes (Cheesh-Na one above)         No     Date of Diabetic Eye Exam ______________________________  Left Eye      Exam did show retinopathy    Exam did not show retinopathy         Mild       Moderate       None       Proliferative       Severe     Right Eye     Exam did show retinopathy    Exam did not show retinopathy         Mild       Moderate       None       Proliferative       Severe     Comments __________________________________________________________    Practice Providing Exam ______________________________________________    Exam Performed By (print name) _______________________________________      Provider Signature ___________________________________________________      These reports are needed for  compliance    Please fax this completed form and a copy of the Diabetic Eye Exam report to our office located at [place] [address] as soon as possible to [phone] leanne Gray: Phone [phone]    We thank you for your assistance in treating our mutual patient

## 2020-03-02 NOTE — TELEPHONE ENCOUNTER
----- Message from Narayan Hernandez sent at 3/2/2020  1:55 PM EST -----  Regarding: DM Eye Exam  Contact: [phone]  03/02/20 1:56 PM    Hello, our patient Edwin Hassan has had Diabetic Eye Exam completed/performed  Please assist in updating the patient chart by making an External outreach to [facility] located in [place], [place]  The date of service is [id number]      Thank you,  Jocelyn Lyons  [facility]

## 2020-03-02 NOTE — TELEPHONE ENCOUNTER
Upon review of the In Basket request and the patient's chart, initial outreach has been made via fax, please see Contacts section for details  A second outreach attempt will be made within 3 business days      Thank you  Luisito Aguayo MA

## 2020-03-03 NOTE — ASSESSMENT & PLAN NOTE
Poorly Controlled/worsening though less hyperglycemia since insulin adjusted on 2/12  He continues with significant hyperglycemia prior to lunch with blood sugars frequently into the 300s and also occasional hypoglycemia  Discussed importance of consistent carbohydrate intake to keep blood sugars stable and prevent severe highs/lows  Goal to avoid hypoglycemia/severe hyperglycemia  Reviewed food dietary with patient  He is often eating a large bowl of cereal for breakfast along with fruit and Juice  He does not want to see dietician or count carbs but he is willing to cut back portion of carbohydrate at breakfast-- he will try to stop drinking juice and if eating cereal, use a smaller bowl  Continue same medications and send BG log for review in two weeks  If he notices any more hypoglycemia with dietary change he should let us know right away

## 2020-03-05 NOTE — TELEPHONE ENCOUNTER
As a follow-up, a second attempt has been made for outreach via fax, please see Contacts section for details  A third and final attempt will be made within 3 business days      Thank you  Carolynn George MA

## 2020-03-09 DIAGNOSIS — E11.39 TYPE 2 DIABETES MELLITUS WITH OTHER OPHTHALMIC COMPLICATION, WITH LONG-TERM CURRENT USE OF INSULIN (HCC): Primary | ICD-10-CM

## 2020-03-09 DIAGNOSIS — Z79.4 TYPE 2 DIABETES MELLITUS WITH OTHER OPHTHALMIC COMPLICATION, WITH LONG-TERM CURRENT USE OF INSULIN (HCC): Primary | ICD-10-CM

## 2020-03-09 RX ORDER — INSULIN LISPRO 100 [IU]/ML
INJECTION, SOLUTION INTRAVENOUS; SUBCUTANEOUS
Qty: 90 ML | Refills: 1 | Status: SHIPPED | OUTPATIENT
Start: 2020-03-09 | End: 2020-04-06 | Stop reason: DRUGHIGH

## 2020-03-09 NOTE — TELEPHONE ENCOUNTER
As a final attempt, a third outreach has been made via telephone call  Please see Contacts section for details  This encounter will be closed and completed by end of day  Should we receive the requested information because of previous outreach attempts, the requested patient's chart will be updated appropriately       Thank you  Bobby Celis MA

## 2020-03-10 ENCOUNTER — OFFICE VISIT (OUTPATIENT)
Dept: FAMILY MEDICINE CLINIC | Facility: CLINIC | Age: 80
End: 2020-03-10
Payer: MEDICARE

## 2020-03-10 VITALS
RESPIRATION RATE: 18 BRPM | OXYGEN SATURATION: 98 % | HEIGHT: 69 IN | SYSTOLIC BLOOD PRESSURE: 120 MMHG | WEIGHT: 234 LBS | BODY MASS INDEX: 34.66 KG/M2 | TEMPERATURE: 97 F | DIASTOLIC BLOOD PRESSURE: 58 MMHG | HEART RATE: 62 BPM

## 2020-03-10 DIAGNOSIS — C22.1 CHOLANGIOCARCINOMA (HCC): ICD-10-CM

## 2020-03-10 DIAGNOSIS — J96.11 CHRONIC RESPIRATORY FAILURE WITH HYPOXIA AND HYPERCAPNIA (HCC): ICD-10-CM

## 2020-03-10 DIAGNOSIS — I50.30 HEART FAILURE WITH PRESERVED EJECTION FRACTION, UNSPECIFIED HF CHRONICITY (HCC): ICD-10-CM

## 2020-03-10 DIAGNOSIS — J43.2 CENTRILOBULAR EMPHYSEMA (HCC): ICD-10-CM

## 2020-03-10 DIAGNOSIS — R91.8 MULTIPLE LUNG NODULES ON CT: ICD-10-CM

## 2020-03-10 DIAGNOSIS — E11.311 TYPE 2 DIABETES MELLITUS WITH RIGHT EYE AFFECTED BY RETINOPATHY AND MACULAR EDEMA, WITH LONG-TERM CURRENT USE OF INSULIN, UNSPECIFIED RETINOPATHY SEVERITY (HCC): ICD-10-CM

## 2020-03-10 DIAGNOSIS — J96.12 CHRONIC RESPIRATORY FAILURE WITH HYPOXIA AND HYPERCAPNIA (HCC): ICD-10-CM

## 2020-03-10 DIAGNOSIS — Z79.4 TYPE 2 DIABETES MELLITUS WITH RIGHT EYE AFFECTED BY RETINOPATHY AND MACULAR EDEMA, WITH LONG-TERM CURRENT USE OF INSULIN, UNSPECIFIED RETINOPATHY SEVERITY (HCC): ICD-10-CM

## 2020-03-10 DIAGNOSIS — D69.6 THROMBOCYTOPENIA (HCC): ICD-10-CM

## 2020-03-10 DIAGNOSIS — G47.33 OBSTRUCTIVE SLEEP APNEA SYNDROME: ICD-10-CM

## 2020-03-10 DIAGNOSIS — I35.0 AORTIC STENOSIS, MODERATE: ICD-10-CM

## 2020-03-10 DIAGNOSIS — Z00.00 MEDICARE ANNUAL WELLNESS VISIT, SUBSEQUENT: Primary | ICD-10-CM

## 2020-03-10 DIAGNOSIS — N18.2 STAGE 2 CHRONIC KIDNEY DISEASE: ICD-10-CM

## 2020-03-10 PROCEDURE — 1123F ACP DISCUSS/DSCN MKR DOCD: CPT | Performed by: INTERNAL MEDICINE

## 2020-03-10 PROCEDURE — 1170F FXNL STATUS ASSESSED: CPT | Performed by: INTERNAL MEDICINE

## 2020-03-10 PROCEDURE — 3074F SYST BP LT 130 MM HG: CPT | Performed by: INTERNAL MEDICINE

## 2020-03-10 PROCEDURE — 3052F HG A1C>EQUAL 8.0%<EQUAL 9.0%: CPT | Performed by: INTERNAL MEDICINE

## 2020-03-10 PROCEDURE — G0439 PPPS, SUBSEQ VISIT: HCPCS | Performed by: INTERNAL MEDICINE

## 2020-03-10 PROCEDURE — 1125F AMNT PAIN NOTED PAIN PRSNT: CPT | Performed by: INTERNAL MEDICINE

## 2020-03-10 PROCEDURE — 3066F NEPHROPATHY DOC TX: CPT | Performed by: INTERNAL MEDICINE

## 2020-03-10 PROCEDURE — 1160F RVW MEDS BY RX/DR IN RCRD: CPT | Performed by: INTERNAL MEDICINE

## 2020-03-10 PROCEDURE — 99214 OFFICE O/P EST MOD 30 MIN: CPT | Performed by: INTERNAL MEDICINE

## 2020-03-10 PROCEDURE — 3078F DIAST BP <80 MM HG: CPT | Performed by: INTERNAL MEDICINE

## 2020-03-10 PROCEDURE — 1036F TOBACCO NON-USER: CPT | Performed by: INTERNAL MEDICINE

## 2020-03-10 PROCEDURE — 4040F PNEUMOC VAC/ADMIN/RCVD: CPT | Performed by: INTERNAL MEDICINE

## 2020-03-10 NOTE — PROGRESS NOTES
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, with long-term current use of insulin, unspecified retinopathy severity (HCC)    Chronic respiratory failure with hypoxia and hypercapnia (HCC)    Obstructive sleep apnea syndrome    Heart failure with preserved ejection fraction, unspecified HF chronicity (HCC)    Aortic stenosis, moderate    Stage 2 chronic kidney disease    Ch[facility]angiocarcinoma ([facility] )    Multiple lung nodules on CT        Chief Complaint   Patient presents with    Medicare Wellness Visit    Diabetes         Subjective: This 78-year-old male seen for the following:    Cholangiocarcinoma his being treated with current immunotherapy with the lack last CT scan in January demonstrating no change in size of the large lesion in the liver and no evidence of metastasis or lymph node enlargement  The small nodules in lung have not changed in size  The patient feels well on eats well and has not lost significant weight  Type 2 diabetes mellitus insulin dependent is stable on the current insulin dose as managed by the diabetes care team   Diet was reviewed  Patient complains of going to the bathroom to urinate frequently but this is only during the day and he only urinates twice during the night  He is stable on current dose of furosemide and metolazone  Uses portable oxygen 2 L when active and going out and 3 L when sedentary or sleeping  His weight is stable at 234 lb  Patient ID: Frieda Grimaldo is a 79 y o  male         Current Outpatient Medications:     atorvastatin (LIPITOR) 40 mg tablet, TAKE 1 TABLET BY MOUTH  DAILY, Disp: 90 tablet, Rfl: 3    B-D UF III MINI PEN NEEDLES 31G X 5 MM MISC, USE 5 TIMES A DAY, Disp: 450 each, Rfl: 3    ferrous sulfate 325 (65 Fe) mg tablet, Take 1 tablet (325 mg total) by mouth 2 (two) times a day with meals, Disp: 180 tablet, Rfl: 3    furosemide (LASIX) 80 mg tablet, TAKE 1 TABLET BY MOUTH TWO  TIMES DAILY, Disp: 180 tablet, Rfl: 3    glucose blood (FREESTYLE LITE) test strip, Use 4/day DX E11 9, insulin treated, Disp: 400 each, Rfl: 3    HUMALOG KWIKPEN 100 units/mL injection pen, INJECT SUBCUTANEOUSLY 14  UNITS BEFORE BREAKFAST 12  UNITS BEFORE LUNCH 45 UNITS BEFORE SUPPER AND 20 UNITS  BEFORE BEDTIME, Disp: 90 mL, Rfl: 1    insulin glargine (TOUJEO) 300 units/mL CONCETRATED U-300 injection pen (1-unit dial), Inject 70 Units under the skin daily, Disp: 10 pen, Rfl: 1    levothyroxine 88 mcg tablet, TAKE 1 TABLET BY MOUTH  DAILY, Disp: 90 tablet, Rfl: 3    lisinopril (ZESTRIL) 20 mg tablet, TAKE 1 TABLET BY MOUTH  DAILY, Disp: 90 tablet, Rfl: 3    metolazone (ZAROXOLYN) 2 5 mg tablet, Take 1 tablet (2 5 mg total) by mouth daily, Disp: 90 tablet, Rfl: 1    metoprolol succinate (TOPROL-XL) 25 mg 24 hr tablet, TAKE 1 TABLET BY MOUTH  DAILY, Disp: 90 tablet, Rfl: 3    Multiple Vitamins-Minerals (EYE VITAMINS) CAPS, Take 1 capsule by mouth daily, Disp: , Rfl:     Multiple Vitamins-Minerals (MULTIVITAMIN ADULT PO), Take 1 tablet by mouth daily, Disp: , Rfl:     pantoprazole (PROTONIX) 40 mg tablet, TAKE 1 TABLET BY MOUTH  DAILY (Patient taking differently: Take 40 mg by mouth 2 (two) times a day ), Disp: 90 tablet, Rfl: 3    tamsulosin (FLOMAX) 0 4 mg, Take 1 capsule (0 4 mg total) by mouth daily with dinner, Disp: 90 capsule, Rfl: 2    The following portions of the patient's history were reviewed and updated as appropriate: allergies, current medications, past family history, past medical history, past social history, past surgical history and problem list     Review of Systems   Constitutional: Negative for appetite change, fatigue, fever and unexpected weight change  HENT: Negative for rhinorrhea, sinus pressure, sinus pain, sneezing and sore throat  Eyes: Negative for visual disturbance  Respiratory: Negative for cough, chest tightness, shortness of breath and wheezing  Cardiovascular: Positive for leg swelling  Negative for chest pain and palpitations  Gastrointestinal: Negative for abdominal distention, abdominal pain, blood in stool, constipation, diarrhea, nausea and vomiting  Endocrine: Negative for polydipsia and polyuria  Genitourinary: Negative for decreased urine volume, difficulty urinating, dysuria, hematuria and urgency  Musculoskeletal: Negative for arthralgias, back pain, joint swelling and neck pain  Skin: Negative for rash  Allergic/Immunologic: Negative for environmental allergies  Neurological: Negative for tremors, weakness, light-headedness, numbness and headaches  Hematological: Does not bruise/bleed easily  Psychiatric/Behavioral: Negative for agitation, behavioral problems, confusion and dysphoric mood  The patient is not nervous/anxious            Family History   Problem Relation Age of Onset    Diabetes type II Maternal Grandfather     Diabetes type II Paternal Grandfather     Coronary artery disease Father     Kidney disease Sister     Ovarian cancer Maternal Grandmother        Past Medical History:   Diagnosis Date    Aortic stenosis     Benign hypertension     Chronic diastolic heart failure (HCC)     Colon polyps     Diabetes ([facility] )     Diverticulitis     GERD (gastroesophageal reflux disease)     Hypertension     Hypothyroidism     Obstructive sleep apnea syndrome     Prostatism     Sleep apnea     Stomach ulcer     Stroke ([facility] )     Thyroid disease     Vitamin D deficiency        Past Surgical History:   Procedure Laterality Date    CATARACT EXTRACTION      IR PORT REMOVAL      REPLACEMENT TOTAL KNEE      SINUS SURGERY      with deviated septum       Social History     Socioeconomic History    Marital status: /Civil Union     Spouse name: None    Number of children: None    Years of education: None    Highest education level: None   Occupational History    Occupation: retired   Social Needs    Financial resource strain: None    Food insecurity:     Worry: None     Inability: None    Transportation needs:     Medical: None     Non-medical: None   Tobacco Use    Smoking status: Former Smoker     Types: Cigarettes     Last attempt to quit:      Years since quittin 2    Smokeless tobacco: Never Used   Substance and Sexual Activity    Alcohol use: No    Drug use: No    Sexual activity: Not Currently     Partners: Female     Birth control/protection: Abstinence   Lifestyle    Physical activity:     Days per week: None     Minutes per session: None    Stress: None   Relationships    Social connections:     Talks on phone: None     Gets together: None     Attends [employer] service: None     Active member of club or organization: None     Attends meetings of clubs or organizations: None     Relationship status: None    Intimate partner violence:     Fear of current or ex partner: None     Emotionally abused: None     Physically abused: None     Forced sexual activity: None   Other Topics Concern    None   Social History Narrative    Pt states he has 1 cup of tea daily       No Known Allergies      Objective:    /58 (BP Location: Left arm, Patient Position: Sitting, Cuff Size: Adult)   Pulse 62   Temp (!) 97 °F (36 1 °C)   Resp 18   Ht 5' 9" (1 753 m)   Wt 106 kg (234 lb)   SpO2 98%   BMI 34 56 kg/m²        Physical Exam   Constitutional: He is oriented to person, place, and time  He appears well-developed and well-nourished  No distress     Obese, using portable oxygen at 2 liters/minute, at 3 liters/minute when sleeping   HENT:   Head: Normocephalic and atraumatic  Nose: Nose normal    Mouth/Throat: Oropharynx is clear and moist  No oropharyngeal exudate  Eyes: Pupils are equal, round, and reactive to light  Conjunctivae and EOM are normal  No scleral icterus  Neck: Normal range of motion  Neck supple  No JVD present  No tracheal deviation present  No thyromegaly present  Cardiovascular: Normal rate, regular rhythm and normal heart sounds  Exam reveals no gallop and no friction rub  No murmur heard  Pulmonary/Chest: Effort normal  No respiratory distress  He has no wheezes  He has no rales  He exhibits no tenderness  Decreased breath sounds with a few crackles right base   Abdominal: Soft  Bowel sounds are normal  He exhibits no distension and no mass  There is no tenderness  There is no rebound and no guarding  Musculoskeletal: Normal range of motion  He exhibits edema (Trace edema bilaterally)  He exhibits no deformity  Lymphadenopathy:     He has no cervical adenopathy  Neurological: He is alert and oriented to person, place, and time  No cranial nerve deficit  Coordination normal    Skin: Skin is warm and dry  No rash noted  Psychiatric: He has a normal mood and affect   His behavior is normal  Judgment and thought content normal

## 2020-03-10 NOTE — PROGRESS NOTES
Assessment and Plan:     Problem List Items Addressed This Visit        Endocrine    Type 2 diabetes mellitus with ophthalmic complication, with long-term current use of insulin (HCC)       Respiratory    Chronic respiratory failure (HCC)    Obstructive sleep apnea syndrome    Centrilobular emphysema (HCC)    Relevant Orders    CBC and differential       Cardiovascular and Mediastinum    Aortic stenosis, moderate    (HFpEF) heart failure with preserved ejection fraction (HCC)       Genitourinary    Stage 2 chronic kidney disease       Other    Thrombocytopenia (HCC)    Relevant Orders    CBC and differential    Multiple lung nodules on CT    Cholangiocarcinoma ([facility] [address] )      Other Visit Diagnoses     Medicare annual wellness visit, subsequent    -  Primary           Preventive health issues were discussed with patient, and age appropriate screening tests were ordered as noted in patient's After Visit Summary  Personalized health advice and appropriate referrals for health education or preventive services given if needed, as noted in patient's After Visit Summary       History of Present Illness:     Patient presents for Medicare Annual Wellness visit    Patient Care Team:  Jesenia Dowd MD as PCP - Arjun Green MD as PCP - Endocrinology (Endocrinology)  MD Tasha Pierce PA-C     Problem List:     Patient Active Problem List   Diagnosis    Aortic stenosis, moderate    BPH (benign prostatic hyperplasia)    (HFpEF) heart failure with preserved ejection fraction (HCC)    Chronic respiratory failure ([address] )    GERD (gastroesophageal reflux disease)    Hematochezia    Hyperlipidemia    Hypertension    Hypochromic microcytic anemia    Hypothyroidism    Obesity    Obstructive sleep apnea syndrome    Thrombocytopenia ([address] )    Type 2 diabetes mellitus with ophthalmic complication, with long-term current use of insulin ([address] )    Venous insufficiency    Vitamin D deficiency    Stage 2 chronic kidney disease    Bronchiectasis without complication (HCC)    Centrilobular emphysema (HCC)    Multiple lung nodules on CT    [facility]    Convalescence following chemotherapy      Past Medical and Surgical History:     Past Medical History:   Diagnosis Date    Aortic stenosis     Benign hypertension     Chronic diastolic heart failure (HCC)     Colon polyps     Diabetes (HCC)     Diverticulitis     GERD (gastroesophageal reflux disease)     Hypertension     Hypothyroidism     Obstructive sleep apnea syndrome     Prostatism     Sleep apnea     Stomach ulcer     Stroke ([facility] )     Thyroid disease     Vitamin D deficiency      Past Surgical History:   Procedure Laterality Date    CATARACT EXTRACTION      IR PORT REMOVAL      REPLACEMENT TOTAL KNEE      SINUS SURGERY      with deviated septum      Family History:     Family History   Problem Relation Age of Onset    Diabetes type II Maternal Grandfather     Diabetes type II Paternal Grandfather     Coronary artery disease Father     Kidney disease Sister     Ovarian cancer Maternal Grandmother       Social History:        Social History     Socioeconomic History    Marital status: /Civil Union     Spouse name: None    Number of children: None    Years of education: None    Highest education level: None   Occupational History    Occupation: retired   Social Needs    Financial resource strain: None    Food insecurity:     Worry: None     Inability: None    Transportation needs:     Medical: None     Non-medical: None   Tobacco Use    Smoking status: Former Smoker     Types: Cigarettes     Last attempt to quit: 1981     Years since quittin 2    Smokeless tobacco: Never Used   Substance and Sexual Activity    Alcohol use: No    Drug use: No    Sexual activity: Not Currently     Partners: Female     Birth control/protection: Abstinence   Lifestyle    Physical activity:     Days per week: None Minutes per session: None    Stress: None   Relationships    Social connections:     Talks on phone: None     Gets together: None     Attends Rastafari service: None     Active member of club or organization: None     Attends meetings of clubs or organizations: None     Relationship status: None    Intimate partner violence:     Fear of current or ex partner: None     Emotionally abused: None     Physically abused: None     Forced sexual activity: None   Other Topics Concern    None   Social History Narrative    Pt states he has 1 cup of tea daily      Medications and Allergies:     Current Outpatient Medications   Medication Sig Dispense Refill    atorvastatin (LIPITOR) 40 mg tablet TAKE 1 TABLET BY MOUTH  DAILY 90 tablet 3    B-D UF III MINI PEN NEEDLES 31G X 5 MM MISC USE 5 TIMES A  each 3    ferrous sulfate 325 (65 Fe) mg tablet Take 1 tablet (325 mg total) by mouth 2 (two) times a day with meals 180 tablet 3    furosemide (LASIX) 80 mg tablet TAKE 1 TABLET BY MOUTH TWO  TIMES DAILY 180 tablet 3    glucose blood (FREESTYLE LITE) test strip Use 4/day DX E11 9, insulin treated 400 each 3    HUMALOG KWIKPEN 100 units/mL injection pen INJECT SUBCUTANEOUSLY 14  UNITS BEFORE BREAKFAST 12  UNITS BEFORE LUNCH 45 UNITS BEFORE SUPPER AND 20 UNITS  BEFORE BEDTIME 90 mL 1    insulin glargine (TOUJEO) 300 units/mL CONCETRATED U-300 injection pen (1-unit dial) Inject 70 Units under the skin daily 10 pen 1    levothyroxine 88 mcg tablet TAKE 1 TABLET BY MOUTH  DAILY 90 tablet 3    lisinopril (ZESTRIL) 20 mg tablet TAKE 1 TABLET BY MOUTH  DAILY 90 tablet 3    metolazone (ZAROXOLYN) 2 5 mg tablet Take 1 tablet (2 5 mg total) by mouth daily 90 tablet 1    metoprolol succinate (TOPROL-XL) 25 mg 24 hr tablet TAKE 1 TABLET BY MOUTH  DAILY 90 tablet 3    Multiple Vitamins-Minerals (EYE VITAMINS) CAPS Take 1 capsule by mouth daily      Multiple Vitamins-Minerals (MULTIVITAMIN ADULT PO) Take 1 tablet by mouth daily      pantoprazole (PROTONIX) 40 mg tablet TAKE 1 TABLET BY MOUTH  DAILY (Patient taking differently: Take 40 mg by mouth 2 (two) times a day ) 90 tablet 3    tamsulosin (FLOMAX) 0 4 mg Take 1 capsule (0 4 mg total) by mouth daily with dinner 90 capsule 2     No current facility-administered medications for this visit  No Known Allergies   Immunizations:     Immunization History   Administered Date(s) Administered    INFLUENZA 01/16/2017, 10/24/2017    Influenza Split High Dose Preservative Free IM 01/16/2017, 10/24/2017    Influenza, high dose seasonal 0 5 mL 10/18/2018, 11/08/2019    Pneumococcal Conjugate 13-Valent 02/08/2017      Health Maintenance: There are no preventive care reminders to display for this patient  There are no preventive care reminders to display for this patient  Medicare Health Risk Assessment:     /58 (BP Location: Left arm, Patient Position: Sitting, Cuff Size: Adult)   Pulse 62   Temp (!) 97 °F (36 1 °C)   Resp 18   Ht 5' 9" (1 753 m)   Wt 106 kg (234 lb)   SpO2 98%   BMI 34 56 kg/m²      Christelle Del Rio is here for his Initial Wellness visit  Health Risk Assessment:   Patient rates overall health as good  Patient feels that their physical health rating is slightly better  Eyesight was rated as same  Hearing was rated as slightly worse  Patient feels that their emotional and mental health rating is same  Pain experienced in the last 7 days has been some  Patient's pain rating has been 1/10  Patient states that he has experienced no weight loss or gain in last 6 months  Depression Screening:   PHQ-2 Score: 0      Fall Risk Screening: In the past year, patient has experienced: no history of falling in past year      Home Safety:  Patient does not have trouble with stairs inside or outside of their home  Patient has working smoke alarms and has working carbon monoxide detector  Home safety hazards include: none       Nutrition:   Current diet is No Added Salt and Diabetic  Medications:   Patient is currently taking over-the-counter supplements  OTC medications include: see medication list  Patient is able to manage medications  Activities of Daily Living (ADLs)/Instrumental Activities of Daily Living (IADLs):   Walk and transfer into and out of bed and chair?: Yes  Dress and groom yourself?: Yes    Bathe or shower yourself?: Yes    Feed yourself? Yes  Do your laundry/housekeeping?: Yes  Manage your money, pay your bills and track your expenses?: Yes  Make your own meals?: Yes    Do your own shopping?: Yes    Previous Hospitalizations:   Any hospitalizations or ED visits within the last 12 months?: No      Advance Care Planning:   Living will: Yes    Durable POA for healthcare:  Yes    Advanced directive: Yes      PREVENTIVE SCREENINGS      Cardiovascular Screening:    General: Screening Not Indicated and History Lipid Disorder      Diabetes Screening:     General: Screening Not Indicated and History Diabetes      Prostate Cancer Screening:    General: Screening Not Indicated      Abdominal Aortic Aneurysm (AAA) Screening:    Risk factors include: tobacco use        Lung Cancer Screening:     General: Screening Not Indicated and History Ally Antoine MD

## 2020-04-06 ENCOUNTER — TELEPHONE (OUTPATIENT)
Dept: ENDOCRINOLOGY | Facility: CLINIC | Age: 80
End: 2020-04-06

## 2020-04-06 RX ORDER — INSULIN LISPRO 100 [IU]/ML
INJECTION, SOLUTION INTRAVENOUS; SUBCUTANEOUS
COMMUNITY
End: 2020-05-28 | Stop reason: SDUPTHER

## 2020-04-21 ENCOUNTER — TELEPHONE (OUTPATIENT)
Dept: ENDOCRINOLOGY | Facility: CLINIC | Age: 80
End: 2020-04-21

## 2020-04-21 DIAGNOSIS — Z79.4 TYPE 2 DIABETES MELLITUS WITH OTHER OPHTHALMIC COMPLICATION, WITH LONG-TERM CURRENT USE OF INSULIN (HCC): ICD-10-CM

## 2020-04-21 DIAGNOSIS — E11.39 TYPE 2 DIABETES MELLITUS WITH OTHER OPHTHALMIC COMPLICATION, WITH LONG-TERM CURRENT USE OF INSULIN (HCC): ICD-10-CM

## 2020-04-21 RX ORDER — BLOOD-GLUCOSE METER
KIT MISCELLANEOUS
Qty: 400 EACH | Refills: 3 | Status: SHIPPED | OUTPATIENT
Start: 2020-04-21

## 2020-04-30 ENCOUNTER — TELEPHONE (OUTPATIENT)
Dept: ENDOCRINOLOGY | Facility: CLINIC | Age: 80
End: 2020-04-30

## 2020-05-04 ENCOUNTER — TELEPHONE (OUTPATIENT)
Dept: ENDOCRINOLOGY | Facility: CLINIC | Age: 80
End: 2020-05-04

## 2020-05-18 DIAGNOSIS — E11.39 TYPE 2 DIABETES MELLITUS WITH OTHER OPHTHALMIC COMPLICATION, WITH LONG-TERM CURRENT USE OF INSULIN (HCC): ICD-10-CM

## 2020-05-18 DIAGNOSIS — Z79.4 TYPE 2 DIABETES MELLITUS WITH OTHER OPHTHALMIC COMPLICATION, WITH LONG-TERM CURRENT USE OF INSULIN (HCC): ICD-10-CM

## 2020-05-18 RX ORDER — INSULIN GLARGINE 300 U/ML
INJECTION, SOLUTION SUBCUTANEOUS
Qty: 22.5 ML | Refills: 3 | Status: SHIPPED | OUTPATIENT
Start: 2020-05-18 | End: 2020-06-05 | Stop reason: SDUPTHER

## 2020-05-27 ENCOUNTER — TELEPHONE (OUTPATIENT)
Dept: ENDOCRINOLOGY | Facility: CLINIC | Age: 80
End: 2020-05-27

## 2020-05-28 DIAGNOSIS — E11.39 TYPE 2 DIABETES MELLITUS WITH OTHER OPHTHALMIC COMPLICATION, WITH LONG-TERM CURRENT USE OF INSULIN (HCC): Primary | ICD-10-CM

## 2020-05-28 DIAGNOSIS — Z79.4 TYPE 2 DIABETES MELLITUS WITH OTHER OPHTHALMIC COMPLICATION, WITH LONG-TERM CURRENT USE OF INSULIN (HCC): Primary | ICD-10-CM

## 2020-05-28 RX ORDER — INSULIN LISPRO 100 [IU]/ML
INJECTION, SOLUTION INTRAVENOUS; SUBCUTANEOUS
Qty: 5 PEN
Start: 2020-05-28 | End: 2020-06-10 | Stop reason: SDUPTHER

## 2020-06-05 DIAGNOSIS — E11.39 TYPE 2 DIABETES MELLITUS WITH OTHER OPHTHALMIC COMPLICATION, WITH LONG-TERM CURRENT USE OF INSULIN (HCC): ICD-10-CM

## 2020-06-05 DIAGNOSIS — Z79.4 TYPE 2 DIABETES MELLITUS WITH OTHER OPHTHALMIC COMPLICATION, WITH LONG-TERM CURRENT USE OF INSULIN (HCC): ICD-10-CM

## 2020-06-10 ENCOUNTER — TELEPHONE (OUTPATIENT)
Dept: FAMILY MEDICINE CLINIC | Facility: CLINIC | Age: 80
End: 2020-06-10

## 2020-06-10 DIAGNOSIS — E11.39 TYPE 2 DIABETES MELLITUS WITH OTHER OPHTHALMIC COMPLICATION, WITH LONG-TERM CURRENT USE OF INSULIN (HCC): ICD-10-CM

## 2020-06-10 DIAGNOSIS — Z79.4 TYPE 2 DIABETES MELLITUS WITH OTHER OPHTHALMIC COMPLICATION, WITH LONG-TERM CURRENT USE OF INSULIN (HCC): ICD-10-CM

## 2020-06-10 RX ORDER — INSULIN LISPRO 100 [IU]/ML
INJECTION, SOLUTION INTRAVENOUS; SUBCUTANEOUS
Qty: 105 ML | Refills: 3 | Status: SHIPPED | OUTPATIENT
Start: 2020-06-10 | End: 2020-06-17 | Stop reason: DRUGHIGH

## 2020-06-16 ENCOUNTER — TELEPHONE (OUTPATIENT)
Dept: ENDOCRINOLOGY | Facility: CLINIC | Age: 80
End: 2020-06-16

## 2020-06-17 RX ORDER — INSULIN LISPRO 100 [IU]/ML
INJECTION, SOLUTION INTRAVENOUS; SUBCUTANEOUS
COMMUNITY
End: 2020-08-28 | Stop reason: SDUPTHER

## 2020-06-30 LAB — HBA1C MFR BLD HPLC: 7.2 %

## 2020-07-08 ENCOUNTER — OFFICE VISIT (OUTPATIENT)
Dept: ENDOCRINOLOGY | Facility: CLINIC | Age: 80
End: 2020-07-08
Payer: MEDICARE

## 2020-07-08 VITALS
SYSTOLIC BLOOD PRESSURE: 120 MMHG | WEIGHT: 223.4 LBS | HEART RATE: 60 BPM | DIASTOLIC BLOOD PRESSURE: 60 MMHG | BODY MASS INDEX: 32.99 KG/M2

## 2020-07-08 DIAGNOSIS — E11.311 TYPE 2 DIABETES MELLITUS WITH RIGHT EYE AFFECTED BY RETINOPATHY AND MACULAR EDEMA, WITH LONG-TERM CURRENT USE OF INSULIN, UNSPECIFIED RETINOPATHY SEVERITY (HCC): Primary | ICD-10-CM

## 2020-07-08 DIAGNOSIS — I10 HYPERTENSION, UNSPECIFIED TYPE: ICD-10-CM

## 2020-07-08 DIAGNOSIS — Z79.4 TYPE 2 DIABETES MELLITUS WITH RIGHT EYE AFFECTED BY RETINOPATHY AND MACULAR EDEMA, WITH LONG-TERM CURRENT USE OF INSULIN, UNSPECIFIED RETINOPATHY SEVERITY (HCC): Primary | ICD-10-CM

## 2020-07-08 DIAGNOSIS — E03.9 HYPOTHYROIDISM, UNSPECIFIED TYPE: ICD-10-CM

## 2020-07-08 DIAGNOSIS — E78.5 HYPERLIPIDEMIA, UNSPECIFIED HYPERLIPIDEMIA TYPE: ICD-10-CM

## 2020-07-08 PROCEDURE — 4040F PNEUMOC VAC/ADMIN/RCVD: CPT | Performed by: INTERNAL MEDICINE

## 2020-07-08 PROCEDURE — 3074F SYST BP LT 130 MM HG: CPT | Performed by: INTERNAL MEDICINE

## 2020-07-08 PROCEDURE — 3066F NEPHROPATHY DOC TX: CPT | Performed by: INTERNAL MEDICINE

## 2020-07-08 PROCEDURE — 99214 OFFICE O/P EST MOD 30 MIN: CPT | Performed by: INTERNAL MEDICINE

## 2020-07-08 PROCEDURE — 1160F RVW MEDS BY RX/DR IN RCRD: CPT | Performed by: INTERNAL MEDICINE

## 2020-07-08 PROCEDURE — 3078F DIAST BP <80 MM HG: CPT | Performed by: INTERNAL MEDICINE

## 2020-07-08 PROCEDURE — 1036F TOBACCO NON-USER: CPT | Performed by: INTERNAL MEDICINE

## 2020-07-08 PROCEDURE — 3051F HG A1C>EQUAL 7.0%<8.0%: CPT | Performed by: INTERNAL MEDICINE

## 2020-07-08 NOTE — ASSESSMENT & PLAN NOTE
Lab Results   Component Value Date    HGBA1C 7 2 (H) 06/30/2020   finger stick does not match up with A1C - suggested personal or professional CGM however he wants to see how things go with his wife"s training

## 2020-07-08 NOTE — PROGRESS NOTES
Cintia Saravia 79 y o  male MRN: [id number]    Encounter: [id number]      Assessment/Plan     Problem List Items Addressed This Visit        Endocrine    Hypothyroidism     Continue levothyroxine          Relevant Orders    TSH, 3rd generation Lab Collect    T4, free Lab Collect    Type 2 diabetes mellitus with ophthalmic complication, with long-term current use of insulin (HCC) - Primary       Lab Results   Component Value Date    HGBA1C 7 2 (H) 06/30/2020   finger stick does not match up with A1C - suggested personal or professional CGM however he wants to see how things go with his wife"s training          Relevant Orders    Comprehensive metabolic panel Lab Collect    HEMOGLOBIN A1C W/ EAG ESTIMATION Lab Collect       Cardiovascular and Mediastinum    Hypertension     Bp stable , continue current regimen         Relevant Orders    Microalbumin / creatinine urine ratio Lab Collect       Other    Hyperlipidemia        CC: Diabetes    History of Present Illness     HPI:  80 y/o male with type 2 DM on long term insulin ,HTN ,HLD here for follow up   Current insulin regimen   toujeo 65 units at bedtime   humalog 35-15-30-0  Rare hypoglycemia   Most sugars are high - fasting >200s ,rest of the day 140s-250s   Weight loss - 15 lbs due to dietary modifications     No polyuria , polydypsia , c/o blurry vision , no numbness and tingling in feet  Review of Systems   Constitutional: Negative for fatigue and unexpected weight change  Eyes: Negative for visual disturbance  Cardiovascular: Negative for palpitations and leg swelling  Gastrointestinal: Negative for constipation, diarrhea, nausea and vomiting  Endocrine: Negative for polydipsia and polyuria  Neurological: Negative for dizziness and light-headedness  Psychiatric/Behavioral: Negative for sleep disturbance  All other systems reviewed and are negative        Historical Information   Past Medical History:   Diagnosis Date    Aortic stenosis  Benign hypertension     Chronic diastolic heart failure (HCC)     Colon polyps     Diabetes (HCC)     Diverticulitis     GERD (gastroesophageal reflux disease)     Hypertension     Hypothyroidism     Obstructive sleep apnea syndrome     Prostatism     Sleep apnea     Stomach ulcer     Stroke ([address] )     Thyroid disease     Vitamin D deficiency      Past Surgical History:   Procedure Laterality Date    CATARACT EXTRACTION      IR PORT REMOVAL      REPLACEMENT TOTAL KNEE      SINUS SURGERY      with deviated septum     Social History   Social History     Substance and Sexual Activity   Alcohol Use No     Social History     Substance and Sexual Activity   Drug Use No     Social History     Tobacco Use   Smoking Status Former Smoker    Types: Cigarettes    Last attempt to quit:     Years since quittin 5   Smokeless Tobacco Never Used     Family History:   Family History   Problem Relation Age of Onset    Diabetes type II Maternal Grandfather     Diabetes type II Paternal Grandfather     Coronary artery disease Father     Kidney disease Sister     Ovarian cancer Maternal Grandmother        Meds/Allergies   Current Outpatient Medications   Medication Sig Dispense Refill    atorvastatin (LIPITOR) 40 mg tablet TAKE 1 TABLET BY MOUTH  DAILY 90 tablet 3    ferrous sulfate 325 (65 Fe) mg tablet Take 1 tablet (325 mg total) by mouth 2 (two) times a day with meals 180 tablet 3    furosemide (LASIX) 80 mg tablet TAKE 1 TABLET BY MOUTH TWO  TIMES DAILY 180 tablet 3    insulin glargine (TOUJEO) 300 units/mL CONCETRATED U-300 injection pen (1-unit dial) Inject under the skin daily 65 U daily      insulin lispro (HumaLOG) 100 units/mL injection pen Inject under the skin 3 (three) times a day with meals 35 U @ BF, 10-15 U @ lunch, 30 U @@ dinner      levothyroxine 88 mcg tablet TAKE 1 TABLET BY MOUTH  DAILY 90 tablet 3    lisinopril (ZESTRIL) 20 mg tablet TAKE 1 TABLET BY MOUTH  DAILY 90 tablet 3    metolazone (ZAROXOLYN) 2 5 mg tablet Take 1 tablet (2 5 mg total) by mouth daily 90 tablet 1    metoprolol succinate (TOPROL-XL) 25 mg 24 hr tablet TAKE 1 TABLET BY MOUTH  DAILY 90 tablet 3    Multiple Vitamins-Minerals (EYE VITAMINS) CAPS Take 1 capsule by mouth daily      Multiple Vitamins-Minerals (MULTIVITAMIN ADULT PO) Take 1 tablet by mouth daily      pantoprazole (PROTONIX) 40 mg tablet TAKE 1 TABLET BY MOUTH  DAILY (Patient taking differently: Take 40 mg by mouth 2 (two) times a day ) 90 tablet 3    tamsulosin (FLOMAX) 0 4 mg Take 1 capsule (0 4 mg total) by mouth daily with dinner 90 capsule 2    B-D UF III MINI PEN NEEDLES 31G X 5 MM MISC USE 5 TIMES A  each 3    FREESTYLE LITE test strip Test 4 times daily 400 each 3     No current facility-administered medications for this visit  No Known Allergies    Objective   Vitals: Blood pressure 120/60, pulse 60, weight 101 kg (223 lb 6 4 oz)  Physical Exam   Constitutional: He is oriented to person, place, and time  He appears well-developed and well-nourished  No distress  HENT:   Head: Normocephalic and atraumatic  Eyes: EOM are normal  No scleral icterus  Neck: Normal range of motion  Neck supple  No thyromegaly present  Cardiovascular: Normal rate, regular rhythm and normal heart sounds  No murmur heard  Pulmonary/Chest: Effort normal and breath sounds normal  No respiratory distress  He has no wheezes  He has no rales  Abdominal: Soft  Bowel sounds are normal  He exhibits no distension  There is no tenderness  There is no guarding  Musculoskeletal: Normal range of motion  He exhibits no edema or deformity  Lymphadenopathy:     He has no cervical adenopathy  Neurological: He is alert and oriented to person, place, and time  Skin: Skin is warm and dry  Psychiatric: He has a normal mood and affect  His behavior is normal  Judgment and thought content normal    Vitals reviewed        The history was obtained from the review of the chart, patient  Lab Results:   Lab Results   Component Value Date/Time    Hemoglobin A1C 7 2 (H) 06/30/2020 08:47 AM    Hemoglobin A1C 7 2 06/30/2020    Hemoglobin A1C 8 1 (H) 01/30/2020 08:40 AM    Hemoglobin A1C 8 1 01/30/2020    Hemoglobin A1C 7 6 10/21/2019           Portions of the record may have been created with voice recognition software  Occasional wrong word or "sound a like" substitutions may have occurred due to the inherent limitations of voice recognition software  Read the chart carefully and recognize, using context, where substitutions have occurred

## 2020-07-09 LAB
LEFT EYE DIABETIC RETINOPATHY: NORMAL
RIGHT EYE DIABETIC RETINOPATHY: NORMAL

## 2020-07-16 ENCOUNTER — TELEPHONE (OUTPATIENT)
Dept: ENDOCRINOLOGY | Facility: CLINIC | Age: 80
End: 2020-07-16

## 2020-07-20 ENCOUNTER — TELEPHONE (OUTPATIENT)
Dept: ENDOCRINOLOGY | Facility: CLINIC | Age: 80
End: 2020-07-20

## 2020-08-12 ENCOUNTER — TELEPHONE (OUTPATIENT)
Dept: ENDOCRINOLOGY | Facility: CLINIC | Age: 80
End: 2020-08-12

## 2020-08-14 NOTE — TELEPHONE ENCOUNTER
Mostly high, but was having some lows before lunch, though this seems better on the more recent readings  Reduce breakfast humalog by 5 units to be safe

## 2020-08-28 ENCOUNTER — TELEPHONE (OUTPATIENT)
Dept: ENDOCRINOLOGY | Facility: CLINIC | Age: 80
End: 2020-08-28

## 2020-08-28 DIAGNOSIS — E11.39 TYPE 2 DIABETES MELLITUS WITH OTHER OPHTHALMIC COMPLICATION, WITH LONG-TERM CURRENT USE OF INSULIN (HCC): Primary | ICD-10-CM

## 2020-08-28 DIAGNOSIS — Z79.4 TYPE 2 DIABETES MELLITUS WITH OTHER OPHTHALMIC COMPLICATION, WITH LONG-TERM CURRENT USE OF INSULIN (HCC): Primary | ICD-10-CM

## 2020-08-28 RX ORDER — INSULIN LISPRO 100 [IU]/ML
INJECTION, SOLUTION INTRAVENOUS; SUBCUTANEOUS
Qty: 30 PEN | Refills: 1 | Status: SHIPPED | OUTPATIENT
Start: 2020-08-28

## 2020-08-28 NOTE — TELEPHONE ENCOUNTER
Patient stated he has been taking an additional 12 units of Humalog at night  Is this ok to add to updated order? Patient had sent in BG logs for review  He states adding the 12 units at night has been helping

## 2020-10-06 ENCOUNTER — TELEPHONE (OUTPATIENT)
Dept: ENDOCRINOLOGY | Facility: CLINIC | Age: 80
End: 2020-10-06

## 2020-10-09 LAB
CREAT ?TM UR-SCNC: 73.1 UMOL/L
HBA1C MFR BLD HPLC: 8.1 %

## 2020-10-14 ENCOUNTER — OFFICE VISIT (OUTPATIENT)
Dept: ENDOCRINOLOGY | Facility: CLINIC | Age: 80
End: 2020-10-14
Payer: MEDICARE

## 2020-10-14 VITALS
SYSTOLIC BLOOD PRESSURE: 118 MMHG | TEMPERATURE: 98.2 F | BODY MASS INDEX: 33.41 KG/M2 | DIASTOLIC BLOOD PRESSURE: 50 MMHG | WEIGHT: 225.6 LBS | HEIGHT: 69 IN | HEART RATE: 56 BPM

## 2020-10-14 DIAGNOSIS — I10 HYPERTENSION, UNSPECIFIED TYPE: ICD-10-CM

## 2020-10-14 DIAGNOSIS — E78.5 HYPERLIPIDEMIA, UNSPECIFIED HYPERLIPIDEMIA TYPE: ICD-10-CM

## 2020-10-14 DIAGNOSIS — E03.9 HYPOTHYROIDISM, UNSPECIFIED TYPE: ICD-10-CM

## 2020-10-14 DIAGNOSIS — Z79.4 TYPE 2 DIABETES MELLITUS WITH OTHER OPHTHALMIC COMPLICATION, WITH LONG-TERM CURRENT USE OF INSULIN (HCC): Primary | ICD-10-CM

## 2020-10-14 DIAGNOSIS — E11.39 TYPE 2 DIABETES MELLITUS WITH OTHER OPHTHALMIC COMPLICATION, WITH LONG-TERM CURRENT USE OF INSULIN (HCC): Primary | ICD-10-CM

## 2020-10-14 PROCEDURE — 99214 OFFICE O/P EST MOD 30 MIN: CPT | Performed by: PHYSICIAN ASSISTANT

## 2020-10-15 ENCOUNTER — TELEPHONE (OUTPATIENT)
Dept: ADMINISTRATIVE | Facility: OTHER | Age: 80
End: 2020-10-15

## 2020-10-16 DIAGNOSIS — Q77.7: ICD-10-CM

## 2020-10-16 DIAGNOSIS — Q87.89: ICD-10-CM

## 2020-10-16 DIAGNOSIS — E03.8 OTHER SPECIFIED HYPOTHYROIDISM: ICD-10-CM

## 2020-10-16 DIAGNOSIS — E78.2 MIXED HYPERLIPIDEMIA: ICD-10-CM

## 2020-10-16 RX ORDER — LISINOPRIL 20 MG/1
TABLET ORAL
Qty: 90 TABLET | Refills: 1 | Status: SHIPPED | OUTPATIENT
Start: 2020-10-16

## 2020-10-16 RX ORDER — METOPROLOL SUCCINATE 25 MG/1
TABLET, EXTENDED RELEASE ORAL
Qty: 90 TABLET | Refills: 1 | Status: SHIPPED | OUTPATIENT
Start: 2020-10-16

## 2020-10-16 RX ORDER — LEVOTHYROXINE SODIUM 88 UG/1
TABLET ORAL
Qty: 90 TABLET | Refills: 1 | Status: SHIPPED | OUTPATIENT
Start: 2020-10-16

## 2020-10-16 RX ORDER — FUROSEMIDE 80 MG
TABLET ORAL
Qty: 180 TABLET | Refills: 1 | Status: SHIPPED | OUTPATIENT
Start: 2020-10-16

## 2020-10-20 ENCOUNTER — TELEPHONE (OUTPATIENT)
Dept: ENDOCRINOLOGY | Facility: CLINIC | Age: 80
End: 2020-10-20

## 2020-11-05 ENCOUNTER — OFFICE VISIT (OUTPATIENT)
Dept: FAMILY MEDICINE CLINIC | Facility: CLINIC | Age: 80
End: 2020-11-05
Payer: MEDICARE

## 2020-11-05 VITALS
OXYGEN SATURATION: 99 % | SYSTOLIC BLOOD PRESSURE: 116 MMHG | HEIGHT: 69 IN | TEMPERATURE: 99.9 F | WEIGHT: 226.2 LBS | RESPIRATION RATE: 18 BRPM | BODY MASS INDEX: 33.5 KG/M2 | HEART RATE: 64 BPM | DIASTOLIC BLOOD PRESSURE: 54 MMHG

## 2020-11-05 DIAGNOSIS — E78.2 MIXED HYPERLIPIDEMIA: ICD-10-CM

## 2020-11-05 DIAGNOSIS — N18.31 STAGE 3A CHRONIC KIDNEY DISEASE (HCC): ICD-10-CM

## 2020-11-05 DIAGNOSIS — Z51.89 CONVALESCENCE FOLLOWING CHEMOTHERAPY: ICD-10-CM

## 2020-11-05 DIAGNOSIS — E03.9 HYPOTHYROIDISM, UNSPECIFIED TYPE: ICD-10-CM

## 2020-11-05 DIAGNOSIS — R91.8 MULTIPLE LUNG NODULES ON CT: ICD-10-CM

## 2020-11-05 DIAGNOSIS — Z79.4 TYPE 2 DIABETES MELLITUS WITH OTHER OPHTHALMIC COMPLICATION, WITH LONG-TERM CURRENT USE OF INSULIN (HCC): ICD-10-CM

## 2020-11-05 DIAGNOSIS — I10 ESSENTIAL HYPERTENSION: ICD-10-CM

## 2020-11-05 DIAGNOSIS — C22.1 CHOLANGIOCARCINOMA (HCC): ICD-10-CM

## 2020-11-05 DIAGNOSIS — E11.39 TYPE 2 DIABETES MELLITUS WITH OTHER OPHTHALMIC COMPLICATION, WITH LONG-TERM CURRENT USE OF INSULIN (HCC): ICD-10-CM

## 2020-11-05 DIAGNOSIS — Z23 IMMUNIZATION DUE: Primary | ICD-10-CM

## 2020-11-05 DIAGNOSIS — I35.0 AORTIC STENOSIS, SEVERE: ICD-10-CM

## 2020-11-05 DIAGNOSIS — I50.32 CHRONIC HEART FAILURE WITH PRESERVED EJECTION FRACTION (HCC): ICD-10-CM

## 2020-11-05 PROCEDURE — 99214 OFFICE O/P EST MOD 30 MIN: CPT | Performed by: INTERNAL MEDICINE

## 2020-11-05 PROCEDURE — G0008 ADMIN INFLUENZA VIRUS VAC: HCPCS

## 2020-11-05 PROCEDURE — 90662 IIV NO PRSV INCREASED AG IM: CPT

## 2020-11-07 PROBLEM — I35.0 AORTIC STENOSIS, SEVERE: Status: ACTIVE | Noted: 2020-11-07

## 2020-11-13 ENCOUNTER — TELEPHONE (OUTPATIENT)
Dept: FAMILY MEDICINE CLINIC | Facility: CLINIC | Age: 80
End: 2020-11-13

## 2021-08-03 NOTE — TELEPHONE ENCOUNTER
Received blood sugar readings There are no Wet Read(s) to document. There is 1 Wet Read(s) to document.